# Patient Record
Sex: FEMALE | Race: ASIAN | NOT HISPANIC OR LATINO
[De-identification: names, ages, dates, MRNs, and addresses within clinical notes are randomized per-mention and may not be internally consistent; named-entity substitution may affect disease eponyms.]

---

## 2017-01-16 ENCOUNTER — RESULT REVIEW (OUTPATIENT)
Age: 67
End: 2017-01-16

## 2017-01-16 VITALS
SYSTOLIC BLOOD PRESSURE: 134 MMHG | HEIGHT: 62 IN | HEART RATE: 50 BPM | WEIGHT: 129.41 LBS | RESPIRATION RATE: 16 BRPM | TEMPERATURE: 98 F | OXYGEN SATURATION: 98 % | DIASTOLIC BLOOD PRESSURE: 59 MMHG

## 2017-01-17 ENCOUNTER — APPOINTMENT (OUTPATIENT)
Dept: GYNECOLOGIC ONCOLOGY | Facility: HOSPITAL | Age: 67
End: 2017-01-17

## 2017-01-17 ENCOUNTER — OUTPATIENT (OUTPATIENT)
Dept: OUTPATIENT SERVICES | Facility: HOSPITAL | Age: 67
LOS: 1 days | Discharge: HOME | End: 2017-01-17
Payer: COMMERCIAL

## 2017-01-17 VITALS
HEART RATE: 64 BPM | DIASTOLIC BLOOD PRESSURE: 86 MMHG | OXYGEN SATURATION: 99 % | RESPIRATION RATE: 15 BRPM | SYSTOLIC BLOOD PRESSURE: 181 MMHG

## 2017-01-17 DIAGNOSIS — Z41.9 ENCOUNTER FOR PROCEDURE FOR PURPOSES OTHER THAN REMEDYING HEALTH STATE, UNSPECIFIED: Chronic | ICD-10-CM

## 2017-01-17 PROCEDURE — 57520 CONIZATION OF CERVIX: CPT

## 2017-01-17 PROCEDURE — 88305 TISSUE EXAM BY PATHOLOGIST: CPT

## 2017-01-17 PROCEDURE — 88307 TISSUE EXAM BY PATHOLOGIST: CPT

## 2017-01-17 PROCEDURE — 88360 TUMOR IMMUNOHISTOCHEM/MANUAL: CPT

## 2017-01-17 PROCEDURE — 88341 IMHCHEM/IMCYTCHM EA ADD ANTB: CPT

## 2017-01-17 RX ORDER — SODIUM CHLORIDE 9 MG/ML
1000 INJECTION, SOLUTION INTRAVENOUS
Qty: 0 | Refills: 0 | Status: DISCONTINUED | OUTPATIENT
Start: 2017-01-17 | End: 2017-01-17

## 2017-01-17 RX ORDER — MORPHINE SULFATE 50 MG/1
4 CAPSULE, EXTENDED RELEASE ORAL
Qty: 0 | Refills: 0 | Status: DISCONTINUED | OUTPATIENT
Start: 2017-01-17 | End: 2017-01-17

## 2017-01-20 LAB — SURGICAL PATHOLOGY STUDY: SIGNIFICANT CHANGE UP

## 2017-01-23 DIAGNOSIS — N87.1 MODERATE CERVICAL DYSPLASIA: ICD-10-CM

## 2017-01-23 DIAGNOSIS — I10 ESSENTIAL (PRIMARY) HYPERTENSION: ICD-10-CM

## 2017-02-15 ENCOUNTER — APPOINTMENT (OUTPATIENT)
Dept: GYNECOLOGIC ONCOLOGY | Facility: CLINIC | Age: 67
End: 2017-02-15

## 2017-02-15 VITALS
HEART RATE: 79 BPM | DIASTOLIC BLOOD PRESSURE: 82 MMHG | SYSTOLIC BLOOD PRESSURE: 162 MMHG | WEIGHT: 133 LBS | BODY MASS INDEX: 24.48 KG/M2 | HEIGHT: 62 IN

## 2017-02-15 DIAGNOSIS — N87.9 DYSPLASIA OF CERVIX UTERI, UNSPECIFIED: ICD-10-CM

## 2017-02-15 DIAGNOSIS — D06.9 CARCINOMA IN SITU OF CERVIX, UNSPECIFIED: ICD-10-CM

## 2017-10-31 PROBLEM — I10 ESSENTIAL (PRIMARY) HYPERTENSION: Chronic | Status: ACTIVE | Noted: 2017-01-16

## 2017-10-31 PROBLEM — N87.9 DYSPLASIA OF CERVIX UTERI, UNSPECIFIED: Chronic | Status: ACTIVE | Noted: 2017-01-16

## 2017-12-11 ENCOUNTER — APPOINTMENT (OUTPATIENT)
Dept: INTERNAL MEDICINE | Facility: CLINIC | Age: 67
End: 2017-12-11
Payer: COMMERCIAL

## 2017-12-11 VITALS — DIASTOLIC BLOOD PRESSURE: 82 MMHG | SYSTOLIC BLOOD PRESSURE: 132 MMHG

## 2017-12-11 VITALS
DIASTOLIC BLOOD PRESSURE: 80 MMHG | WEIGHT: 128 LBS | HEART RATE: 70 BPM | OXYGEN SATURATION: 99 % | BODY MASS INDEX: 23.55 KG/M2 | HEIGHT: 62 IN | TEMPERATURE: 98 F | SYSTOLIC BLOOD PRESSURE: 130 MMHG

## 2017-12-11 DIAGNOSIS — Z78.0 ASYMPTOMATIC MENOPAUSAL STATE: ICD-10-CM

## 2017-12-11 PROCEDURE — 99204 OFFICE O/P NEW MOD 45 MIN: CPT

## 2017-12-11 RX ORDER — IBUPROFEN 800 MG/1
800 TABLET, FILM COATED ORAL 3 TIMES DAILY
Qty: 30 | Refills: 0 | Status: COMPLETED | COMMUNITY
Start: 2017-01-13 | End: 2017-12-11

## 2017-12-11 RX ORDER — DOCUSATE SODIUM 100 MG/1
100 CAPSULE ORAL
Qty: 20 | Refills: 0 | Status: COMPLETED | COMMUNITY
Start: 2017-01-13 | End: 2017-12-11

## 2017-12-11 RX ORDER — OXYCODONE AND ACETAMINOPHEN 5; 325 MG/1; MG/1
5-325 TABLET ORAL
Qty: 5 | Refills: 0 | Status: COMPLETED | COMMUNITY
Start: 2017-01-13 | End: 2017-12-11

## 2018-04-05 ENCOUNTER — APPOINTMENT (OUTPATIENT)
Dept: INTERNAL MEDICINE | Facility: CLINIC | Age: 68
End: 2018-04-05
Payer: MEDICARE

## 2018-04-05 VITALS
DIASTOLIC BLOOD PRESSURE: 82 MMHG | HEIGHT: 62 IN | WEIGHT: 132 LBS | BODY MASS INDEX: 24.29 KG/M2 | TEMPERATURE: 97.6 F | OXYGEN SATURATION: 98 % | HEART RATE: 78 BPM | SYSTOLIC BLOOD PRESSURE: 165 MMHG

## 2018-04-05 PROCEDURE — G0439: CPT

## 2018-05-15 LAB
APPEARANCE: CLEAR
BACTERIA: ABNORMAL
BILIRUBIN URINE: NEGATIVE
BLOOD URINE: NEGATIVE
CALCIUM OXALATE CRYSTALS: ABNORMAL
COLOR: ABNORMAL
CREAT SPEC-SCNC: 134 MG/DL
GLUCOSE QUALITATIVE U: NEGATIVE MG/DL
HYALINE CASTS: 1 /LPF
KETONES URINE: NEGATIVE
LEUKOCYTE ESTERASE URINE: NEGATIVE
MICROALBUMIN 24H UR DL<=1MG/L-MCNC: 2.5 MG/DL
MICROALBUMIN/CREAT 24H UR-RTO: 19 MG/G
MICROSCOPIC-UA: NORMAL
NITRITE URINE: NEGATIVE
PH URINE: 5
PROTEIN URINE: NEGATIVE MG/DL
RED BLOOD CELLS URINE: 2 /HPF
SPECIFIC GRAVITY URINE: 1.02
SQUAMOUS EPITHELIAL CELLS: 8 /HPF
URINE COMMENTS: NORMAL
UROBILINOGEN URINE: NEGATIVE MG/DL
WHITE BLOOD CELLS URINE: 3 /HPF

## 2018-10-16 ENCOUNTER — APPOINTMENT (OUTPATIENT)
Dept: INTERNAL MEDICINE | Facility: CLINIC | Age: 68
End: 2018-10-16
Payer: MEDICARE

## 2018-10-16 VITALS
DIASTOLIC BLOOD PRESSURE: 77 MMHG | HEIGHT: 62 IN | SYSTOLIC BLOOD PRESSURE: 139 MMHG | HEART RATE: 64 BPM | WEIGHT: 132 LBS | OXYGEN SATURATION: 97 % | TEMPERATURE: 98 F | BODY MASS INDEX: 24.29 KG/M2

## 2018-10-16 DIAGNOSIS — M25.552 PAIN IN LEFT HIP: ICD-10-CM

## 2018-10-16 PROCEDURE — 99213 OFFICE O/P EST LOW 20 MIN: CPT

## 2019-02-20 ENCOUNTER — MEDICATION RENEWAL (OUTPATIENT)
Age: 69
End: 2019-02-20

## 2019-04-11 ENCOUNTER — MEDICATION RENEWAL (OUTPATIENT)
Age: 69
End: 2019-04-11

## 2019-04-15 ENCOUNTER — MEDICATION RENEWAL (OUTPATIENT)
Age: 69
End: 2019-04-15

## 2019-05-10 ENCOUNTER — APPOINTMENT (OUTPATIENT)
Dept: INTERNAL MEDICINE | Facility: CLINIC | Age: 69
End: 2019-05-10
Payer: MEDICARE

## 2019-05-10 VITALS
SYSTOLIC BLOOD PRESSURE: 159 MMHG | HEART RATE: 78 BPM | HEIGHT: 62 IN | WEIGHT: 133 LBS | OXYGEN SATURATION: 96 % | BODY MASS INDEX: 24.48 KG/M2 | DIASTOLIC BLOOD PRESSURE: 76 MMHG | TEMPERATURE: 97.9 F

## 2019-05-10 DIAGNOSIS — Z86.19 PERSONAL HISTORY OF OTHER INFECTIOUS AND PARASITIC DISEASES: ICD-10-CM

## 2019-05-10 PROCEDURE — 90670 PCV13 VACCINE IM: CPT

## 2019-05-10 PROCEDURE — 99397 PER PM REEVAL EST PAT 65+ YR: CPT | Mod: 25

## 2019-05-10 PROCEDURE — G0009: CPT

## 2019-05-12 NOTE — HISTORY OF PRESENT ILLNESS
[FreeTextEntry1] : CPE [de-identified] : Jan 2019: [facility] labs, will send to me

## 2019-05-12 NOTE — PHYSICAL EXAM
[No Acute Distress] : no acute distress [Well Nourished] : well nourished [Well Developed] : well developed [Well-Appearing] : well-appearing [Normal Sclera/Conjunctiva] : normal sclera/conjunctiva [Normal Outer Ear/Nose] : the outer ears and nose were normal in appearance [Normal Oropharynx] : the oropharynx was normal [No JVD] : no jugular venous distention [Supple] : supple [No Lymphadenopathy] : no lymphadenopathy [Thyroid Normal, No Nodules] : the thyroid was normal and there were no nodules present [No Respiratory Distress] : no respiratory distress  [Clear to Auscultation] : lungs were clear to auscultation bilaterally [No Accessory Muscle Use] : no accessory muscle use [Normal Rate] : normal rate  [Regular Rhythm] : with a regular rhythm [Normal S1, S2] : normal S1 and S2 [No Murmur] : no murmur heard [No Edema] : there was no peripheral edema [No Extremity Clubbing/Cyanosis] : no extremity clubbing/cyanosis [Soft] : abdomen soft [Non Tender] : non-tender [Non-distended] : non-distended [No Masses] : no abdominal mass palpated [No HSM] : no HSM [Normal Bowel Sounds] : normal bowel sounds [Normal Posterior Cervical Nodes] : no posterior cervical lymphadenopathy [Normal Anterior Cervical Nodes] : no anterior cervical lymphadenopathy [No CVA Tenderness] : no CVA  tenderness [No Spinal Tenderness] : no spinal tenderness [No Joint Swelling] : no joint swelling [Grossly Normal Strength/Tone] : grossly normal strength/tone [No Rash] : no rash [Normal Gait] : normal gait [Coordination Grossly Intact] : coordination grossly intact [No Focal Deficits] : no focal deficits [Normal Affect] : the affect was normal [Alert and Oriented x3] : oriented to person, place, and time [Normal Insight/Judgement] : insight and judgment were intact [de-identified] : anxious when discussing recent episode with her

## 2019-05-12 NOTE — HEALTH RISK ASSESSMENT
[0] : 2) Feeling down, depressed, or hopeless: Not at all (0) [FreeTextEntry1] : anxiety [[id number]] : 0

## 2019-06-04 LAB
APPEARANCE: CLEAR
BACTERIA: ABNORMAL
BILIRUBIN URINE: NEGATIVE
BLOOD URINE: NEGATIVE
COLOR: YELLOW
CREAT SPEC-SCNC: 108 MG/DL
GLUCOSE QUALITATIVE U: NEGATIVE
HYALINE CASTS: 3 /LPF
KETONES URINE: NEGATIVE
LEUKOCYTE ESTERASE URINE: NEGATIVE
MICROALBUMIN 24H UR DL<=1MG/L-MCNC: 1.7 MG/DL
MICROALBUMIN/CREAT 24H UR-RTO: 16 MG/G
MICROSCOPIC-UA: NORMAL
NITRITE URINE: NEGATIVE
PH URINE: 7
PROTEIN URINE: NORMAL
RED BLOOD CELLS URINE: 6 /HPF
SPECIFIC GRAVITY URINE: 1.02
SQUAMOUS EPITHELIAL CELLS: 4 /HPF
UROBILINOGEN URINE: NORMAL
WHITE BLOOD CELLS URINE: 2 /HPF

## 2019-06-10 ENCOUNTER — APPOINTMENT (OUTPATIENT)
Dept: INTERNAL MEDICINE | Facility: CLINIC | Age: 69
End: 2019-06-10

## 2019-07-22 ENCOUNTER — APPOINTMENT (OUTPATIENT)
Dept: INTERNAL MEDICINE | Facility: CLINIC | Age: 69
End: 2019-07-22
Payer: MEDICARE

## 2019-07-22 VITALS
BODY MASS INDEX: 23.74 KG/M2 | WEIGHT: 129 LBS | TEMPERATURE: 97.8 F | OXYGEN SATURATION: 97 % | HEIGHT: 62 IN | DIASTOLIC BLOOD PRESSURE: 80 MMHG | HEART RATE: 64 BPM | SYSTOLIC BLOOD PRESSURE: 178 MMHG

## 2019-07-22 PROCEDURE — 69210 REMOVE IMPACTED EAR WAX UNI: CPT

## 2019-07-22 PROCEDURE — 99214 OFFICE O/P EST MOD 30 MIN: CPT | Mod: 25

## 2019-07-24 ENCOUNTER — APPOINTMENT (OUTPATIENT)
Dept: OTOLARYNGOLOGY | Facility: CLINIC | Age: 69
End: 2019-07-24
Payer: MEDICARE

## 2019-07-24 VITALS
DIASTOLIC BLOOD PRESSURE: 70 MMHG | SYSTOLIC BLOOD PRESSURE: 146 MMHG | HEIGHT: 62 IN | WEIGHT: 130 LBS | HEART RATE: 58 BPM | OXYGEN SATURATION: 98 % | BODY MASS INDEX: 23.92 KG/M2 | TEMPERATURE: 98.1 F

## 2019-07-24 DIAGNOSIS — Z78.9 OTHER SPECIFIED HEALTH STATUS: ICD-10-CM

## 2019-07-24 DIAGNOSIS — Z86.79 PERSONAL HISTORY OF OTHER DISEASES OF THE CIRCULATORY SYSTEM: ICD-10-CM

## 2019-07-24 PROCEDURE — 99204 OFFICE O/P NEW MOD 45 MIN: CPT

## 2019-07-24 NOTE — REVIEW OF SYSTEMS
[Dizziness] : dizziness [Unsteady Walking] : ataxia [Negative] : Psychiatric [Confusion] : no confusion [Headache] : no headache [Fainting] : no fainting [Memory Loss] : no memory loss

## 2019-07-24 NOTE — PHYSICAL EXAM
[Midline] : trachea located in midline position [] : [place]-Hallpike test is positive [Normal] : no rashes [de-identified] : r [de-identified] : gait steady

## 2019-07-24 NOTE — HISTORY OF PRESENT ILLNESS
[FreeTextEntry8] : vertigo.\par \par 1 week ago: was lying on side, felt like the ceiling was spinning for a few seconds. Much worse when moving head position, felt off balance.  Went to urgent care last Friday: BP elevated systolic 180.  Diagnosed with UTI, prescribed Bactrim, still on last 2 days of it.  No tinnitus or hearing changes.  No f/chills.  Friend suggested meclizine but it hasn't lasted long enough to be worth trying a medication.\par \par She is currently wearing Holter monitor, atenolol was decreased to 12.5 mg daily.

## 2019-07-24 NOTE — HISTORY OF PRESENT ILLNESS
[de-identified] : 69 yop woman with positional verigo for about a month - began with treatment for a uti. She feels the sx are severe but can last only secs, brought about by posnal change. she went to urgent care and was referred to ent., denies hl or tinnitus. states Dr Tapia removed cerumen recently. Has not had this in the past. no fh relevant to cc. denies head trauma or other inciting event no hl or tinnitus.

## 2019-07-24 NOTE — HISTORY OF PRESENT ILLNESS
[de-identified] : 69 yop woman with positional verigo for about a month - began with treatment for a uti. She feels the sx are severe but can last only secs, brought about by posnal change. she went to urgent care and was referred to ent., denies hl or tinnitus. states Dr Tapia removed cerumen recently. Has not had this in the past. no fh relevant to cc. denies head trauma or other inciting event no hl or tinnitus.

## 2019-07-24 NOTE — PHYSICAL EXAM
[Coordination Grossly Intact] : coordination grossly intact [No Focal Deficits] : no focal deficits [Alert and Oriented x3] : oriented to person, place, and time [Normal Gait] : normal gait [Normal] : affect was normal and insight and judgment were intact [de-identified] : R cerumen impaction cleared with curette.  L TM clear

## 2019-07-24 NOTE — ASSESSMENT
[FreeTextEntry1] : She is describing positional vertigo.  ENT eval was recommended by urgent care.  I agree with this.  Perhaps related to her UTI or R cerumen impaction.  Will prescribe meclizine in case it ever lasts for longer.  Recommended slow position changes, particularly first thing in AM.\par \par She inquired about stopping atenolol completely.  Recommended she stay on 12.5 mg daily as she might be at risk for tachycardia if she stops it.  She'll follow-up with her cardiologist after Holter and specifically discuss.

## 2019-07-24 NOTE — PHYSICAL EXAM
[Midline] : trachea located in midline position [] : [place]-Hallpike test is positive [Normal] : no rashes [de-identified] : gait steady [de-identified] : r

## 2019-07-25 ENCOUNTER — FORM ENCOUNTER (OUTPATIENT)
Age: 69
End: 2019-07-25

## 2019-07-26 ENCOUNTER — TRANSCRIPTION ENCOUNTER (OUTPATIENT)
Age: 69
End: 2019-07-26

## 2019-07-26 ENCOUNTER — OUTPATIENT (OUTPATIENT)
Dept: OUTPATIENT SERVICES | Facility: HOSPITAL | Age: 69
LOS: 1 days | End: 2019-07-26
Payer: COMMERCIAL

## 2019-07-26 ENCOUNTER — APPOINTMENT (OUTPATIENT)
Dept: ULTRASOUND IMAGING | Facility: HOSPITAL | Age: 69
End: 2019-07-26

## 2019-07-26 DIAGNOSIS — Z41.9 ENCOUNTER FOR PROCEDURE FOR PURPOSES OTHER THAN REMEDYING HEALTH STATE, UNSPECIFIED: Chronic | ICD-10-CM

## 2019-07-26 PROCEDURE — 93880 EXTRACRANIAL BILAT STUDY: CPT | Mod: 26

## 2019-07-26 PROCEDURE — 93880 EXTRACRANIAL BILAT STUDY: CPT

## 2019-08-14 ENCOUNTER — APPOINTMENT (OUTPATIENT)
Dept: OTOLARYNGOLOGY | Facility: CLINIC | Age: 69
End: 2019-08-14
Payer: MEDICARE

## 2019-08-14 VITALS
DIASTOLIC BLOOD PRESSURE: 86 MMHG | WEIGHT: 132 LBS | HEIGHT: 62 IN | BODY MASS INDEX: 24.29 KG/M2 | HEART RATE: 71 BPM | SYSTOLIC BLOOD PRESSURE: 163 MMHG

## 2019-08-14 DIAGNOSIS — Z87.898 PERSONAL HISTORY OF OTHER SPECIFIED CONDITIONS: ICD-10-CM

## 2019-08-14 DIAGNOSIS — H61.21 IMPACTED CERUMEN, RIGHT EAR: ICD-10-CM

## 2019-08-14 DIAGNOSIS — J35.8 OTHER CHRONIC DISEASES OF TONSILS AND ADENOIDS: ICD-10-CM

## 2019-08-14 PROCEDURE — 31575 DIAGNOSTIC LARYNGOSCOPY: CPT

## 2019-08-14 PROCEDURE — 99213 OFFICE O/P EST LOW 20 MIN: CPT | Mod: 25

## 2019-08-19 ENCOUNTER — FORM ENCOUNTER (OUTPATIENT)
Age: 69
End: 2019-08-19

## 2019-08-20 ENCOUNTER — OUTPATIENT (OUTPATIENT)
Dept: OUTPATIENT SERVICES | Facility: HOSPITAL | Age: 69
LOS: 1 days | End: 2019-08-20
Payer: MEDICARE

## 2019-08-20 ENCOUNTER — APPOINTMENT (OUTPATIENT)
Dept: ULTRASOUND IMAGING | Facility: HOSPITAL | Age: 69
End: 2019-08-20
Payer: MEDICARE

## 2019-08-20 DIAGNOSIS — Z41.9 ENCOUNTER FOR PROCEDURE FOR PURPOSES OTHER THAN REMEDYING HEALTH STATE, UNSPECIFIED: Chronic | ICD-10-CM

## 2019-08-20 PROCEDURE — 76536 US EXAM OF HEAD AND NECK: CPT

## 2019-08-20 PROCEDURE — 76536 US EXAM OF HEAD AND NECK: CPT | Mod: 26

## 2019-10-01 PROBLEM — H61.21 IMPACTED CERUMEN OF RIGHT EAR: Status: RESOLVED | Noted: 2019-07-24 | Resolved: 2019-10-01

## 2019-10-01 PROBLEM — J35.8 CRYPTIC TONSIL: Status: ACTIVE | Noted: 2019-10-01

## 2019-10-01 PROBLEM — Z87.898 HISTORY OF VERTIGO: Status: RESOLVED | Noted: 2019-07-22 | Resolved: 2019-10-01

## 2019-10-01 RX ORDER — ZOSTER VACCINE RECOMBINANT, ADJUVANTED 50 MCG/0.5
50 KIT INTRAMUSCULAR
Qty: 1 | Refills: 1 | Status: COMPLETED | COMMUNITY
Start: 2019-05-10 | End: 2019-05-10

## 2019-10-01 RX ORDER — MECLIZINE HYDROCHLORIDE 12.5 MG/1
12.5 TABLET ORAL
Qty: 15 | Refills: 0 | Status: DISCONTINUED | COMMUNITY
Start: 2019-07-22 | End: 2019-08-01

## 2019-10-01 RX ORDER — SULFAMETHOXAZOLE AND TRIMETHOPRIM 800; 160 MG/1; MG/1
TABLET ORAL
Refills: 0 | Status: COMPLETED | COMMUNITY
End: 2019-08-01

## 2019-10-01 NOTE — PROCEDURE
[de-identified] : \par Indication: tracheal deviation\par -Verbal consent was obtained from patient prior to procedure.\par Flexible laryngoscopy was performed via mouth and revealed the following:\par   -- Base of tongue was symmetric and not enlarged.\par   -- Vallecula was clear\par   -- Epiglottis, both aryepiglottic folds and both false vocal folds were normal\par   -- Arytenoids both without edema and erythema \par   -- True vocal folds were fully mobile and without lesions. \par   -- Post cricoid area was clear.\par   -- Interarytenoid edema was absent\par \par The patient tolerated the procedure well.\par \par

## 2019-10-01 NOTE — CONSULT LETTER
[Dear  ___] : Dear  [unfilled], [Consult Letter:] : I had the pleasure of evaluating your patient, [unfilled]. [Consult Closing:] : Thank you very much for allowing me to participate in the care of this patient.  If you have any questions, please do not hesitate to contact me. [Sincerely,] : Sincerely, [FreeTextEntry2] : Zully Tapia MD\par [address]\[place] [address]\par  [FreeTextEntry1] : \par \par Enclosed please find my office notes for August 14, 2019. \par \par  [FreeTextEntry3] : \par Lara Block MD \par Otolaryngology, Head and Neck Surgery \par \par

## 2019-10-01 NOTE — ASSESSMENT
[FreeTextEntry1] : Ms. Marquez was evaluated for the following today:\par \par 1.) multinodular goiter, with nodule seen on recent carotid US.\par Hx of thyroid nodules at 5-6 years ago.  Prior medication for hyperthyroidism, now euthyroid and off meds.\par Right lobe enlarged but no discrete nodule on exam; has tracheal deviation but no compressive sx.\par 2.) vertigo from right BPPV resolved\par 3.) asymptomatic cryptic tonsils\par \par PLAN:\par - US thyroid\par \par Return after US\par

## 2019-10-01 NOTE — HISTORY OF PRESENT ILLNESS
[de-identified] : I was pleased to evaluate this 69-year-old woman who was referred by Dr. Tapia for a thyroid nodule.  \par \par Nodules noted by technician on carotid US in July 2019 but not mentioned on official report.\par Hx of nodules in thyroid about 6-7 years ago.  Had FNA of mass that showed 'water" (?cyst)\par No difficulty breathing, difficulty swallowing or voice change.\par Treated with tapazole [id number] for hyperthyroidism after developed an arrhythmia. Currently euthyroid, no meds.\par Family history is negative of thyroid disease or cancer\par No history of radiation other than routine imaging.\par \par Had vertigo last month, diagnosed with Right BPPV by Dr. Perez.\par Vertigo resolved.  No longer taking meclizine.\par \par \par \par

## 2019-10-01 NOTE — REVIEW OF SYSTEMS
[Patient Intake Form Reviewed] : Patient intake form was reviewed [Negative] : Genitourinary [As Noted in HPI] : as noted in HPI [FreeTextEntry5] : irregular heartbeat [de-identified] : thinning hair

## 2019-10-01 NOTE — PHYSICAL EXAM
[L] : deviated to the left [Laryngoscopy Performed] : laryngoscopy was performed, see procedure section for findings [Normal] : no rashes [FreeTextEntry1] : No hoarseness. [de-identified] : Thyroid lobe on right is enlarged, over 6 cm, smooth, nontender. [de-identified] : No mass other than thyroid. [de-identified] : 1-2+ cryptic with debris, bilateral. [de-identified] : Carotid pulses 2+ bilateral.

## 2019-10-02 ENCOUNTER — APPOINTMENT (OUTPATIENT)
Dept: OTOLARYNGOLOGY | Facility: CLINIC | Age: 69
End: 2019-10-02

## 2019-10-22 ENCOUNTER — FORM ENCOUNTER (OUTPATIENT)
Age: 69
End: 2019-10-22

## 2019-10-23 ENCOUNTER — APPOINTMENT (OUTPATIENT)
Dept: ULTRASOUND IMAGING | Facility: HOSPITAL | Age: 69
End: 2019-10-23
Payer: MEDICARE

## 2019-10-23 ENCOUNTER — OUTPATIENT (OUTPATIENT)
Dept: OUTPATIENT SERVICES | Facility: HOSPITAL | Age: 69
LOS: 1 days | End: 2019-10-23
Payer: MEDICARE

## 2019-10-23 ENCOUNTER — RESULT REVIEW (OUTPATIENT)
Age: 69
End: 2019-10-23

## 2019-10-23 DIAGNOSIS — Z41.9 ENCOUNTER FOR PROCEDURE FOR PURPOSES OTHER THAN REMEDYING HEALTH STATE, UNSPECIFIED: Chronic | ICD-10-CM

## 2019-10-23 PROCEDURE — 10005 FNA BX W/US GDN 1ST LES: CPT

## 2019-10-23 PROCEDURE — 88305 TISSUE EXAM BY PATHOLOGIST: CPT

## 2019-10-23 PROCEDURE — 88173 CYTOPATH EVAL FNA REPORT: CPT

## 2019-10-24 LAB — NON-GYNECOLOGICAL CYTOLOGY STUDY: SIGNIFICANT CHANGE UP

## 2020-03-23 ENCOUNTER — APPOINTMENT (OUTPATIENT)
Dept: INTERNAL MEDICINE | Facility: CLINIC | Age: 70
End: 2020-03-23
Payer: MEDICARE

## 2020-03-23 PROCEDURE — G2012 BRIEF CHECK IN BY MD/QHP: CPT

## 2020-04-09 ENCOUNTER — APPOINTMENT (OUTPATIENT)
Dept: INTERNAL MEDICINE | Facility: CLINIC | Age: 70
End: 2020-04-09
Payer: MEDICARE

## 2020-04-09 DIAGNOSIS — R23.2 FLUSHING: ICD-10-CM

## 2020-04-09 PROCEDURE — G2012 BRIEF CHECK IN BY MD/QHP: CPT

## 2020-04-09 RX ORDER — AZITHROMYCIN 500 MG/1
500 TABLET, FILM COATED ORAL
Qty: 6 | Refills: 0 | Status: COMPLETED | COMMUNITY
Start: 2020-03-23

## 2020-04-09 RX ORDER — TRAZODONE HYDROCHLORIDE 50 MG/1
50 TABLET ORAL
Qty: 15 | Refills: 0 | Status: COMPLETED | COMMUNITY
Start: 2019-05-10 | End: 2020-04-09

## 2020-05-12 ENCOUNTER — RX RENEWAL (OUTPATIENT)
Age: 70
End: 2020-05-12

## 2020-06-05 ENCOUNTER — RX RENEWAL (OUTPATIENT)
Age: 70
End: 2020-06-05

## 2020-07-13 ENCOUNTER — APPOINTMENT (OUTPATIENT)
Dept: INTERNAL MEDICINE | Facility: CLINIC | Age: 70
End: 2020-07-13
Payer: MEDICARE

## 2020-07-13 VITALS
DIASTOLIC BLOOD PRESSURE: 85 MMHG | SYSTOLIC BLOOD PRESSURE: 180 MMHG | HEIGHT: 62 IN | OXYGEN SATURATION: 98 % | WEIGHT: 124 LBS | BODY MASS INDEX: 22.82 KG/M2 | TEMPERATURE: 99 F | HEART RATE: 84 BPM

## 2020-07-13 VITALS — DIASTOLIC BLOOD PRESSURE: 84 MMHG | SYSTOLIC BLOOD PRESSURE: 146 MMHG

## 2020-07-13 DIAGNOSIS — M25.511 PAIN IN RIGHT SHOULDER: ICD-10-CM

## 2020-07-13 DIAGNOSIS — R68.83 CHILLS (WITHOUT FEVER): ICD-10-CM

## 2020-07-13 DIAGNOSIS — F41.9 ANXIETY DISORDER, UNSPECIFIED: ICD-10-CM

## 2020-07-13 DIAGNOSIS — F51.05 ANXIETY DISORDER, UNSPECIFIED: ICD-10-CM

## 2020-07-13 DIAGNOSIS — Z12.11 ENCOUNTER FOR SCREENING FOR MALIGNANT NEOPLASM OF COLON: ICD-10-CM

## 2020-07-13 PROCEDURE — 99397 PER PM REEVAL EST PAT 65+ YR: CPT | Mod: 25

## 2020-07-13 PROCEDURE — 36415 COLL VENOUS BLD VENIPUNCTURE: CPT

## 2020-07-13 RX ORDER — ROSUVASTATIN CALCIUM 10 MG/1
10 TABLET, FILM COATED ORAL
Refills: 0 | Status: COMPLETED | COMMUNITY
End: 2020-07-13

## 2020-07-15 ENCOUNTER — RX RENEWAL (OUTPATIENT)
Age: 70
End: 2020-07-15

## 2020-09-08 ENCOUNTER — RX RENEWAL (OUTPATIENT)
Age: 70
End: 2020-09-08

## 2020-09-08 ENCOUNTER — TRANSCRIPTION ENCOUNTER (OUTPATIENT)
Age: 70
End: 2020-09-08

## 2020-09-08 LAB
25(OH)D3 SERPL-MCNC: 29.5 NG/ML
ALBUMIN SERPL ELPH-MCNC: 5 G/DL
ALP BLD-CCNC: 61 U/L
ALT SERPL-CCNC: 14 U/L
ANION GAP SERPL CALC-SCNC: 17 MMOL/L
APPEARANCE: CLEAR
AST SERPL-CCNC: 21 U/L
BACTERIA: ABNORMAL
BASOPHILS # BLD AUTO: 0.11 K/UL
BASOPHILS NFR BLD AUTO: 1.6 %
BILIRUB SERPL-MCNC: 0.5 MG/DL
BILIRUBIN URINE: NEGATIVE
BLOOD URINE: NEGATIVE
BUN SERPL-MCNC: 11 MG/DL
CALCIUM SERPL-MCNC: 10.1 MG/DL
CHLORIDE SERPL-SCNC: 105 MMOL/L
CHOLEST SERPL-MCNC: 164 MG/DL
CHOLEST/HDLC SERPL: 2.4 RATIO
CO2 SERPL-SCNC: 23 MMOL/L
COLOR: NORMAL
CREAT SERPL-MCNC: 0.55 MG/DL
CREAT SPEC-SCNC: 34 MG/DL
EOSINOPHIL # BLD AUTO: 0.17 K/UL
EOSINOPHIL NFR BLD AUTO: 2.5 %
ESTIMATED AVERAGE GLUCOSE: 108 MG/DL
FOLATE SERPL-MCNC: >20 NG/ML
GLUCOSE QUALITATIVE U: NEGATIVE
GLUCOSE SERPL-MCNC: 101 MG/DL
HBA1C MFR BLD HPLC: 5.4 %
HCT VFR BLD CALC: 46.4 %
HDLC SERPL-MCNC: 69 MG/DL
HGB BLD-MCNC: 14.3 G/DL
HYALINE CASTS: 4 /LPF
IMM GRANULOCYTES NFR BLD AUTO: 0.1 %
KETONES URINE: NEGATIVE
LDLC SERPL CALC-MCNC: 73 MG/DL
LEUKOCYTE ESTERASE URINE: ABNORMAL
LYMPHOCYTES # BLD AUTO: 1.96 K/UL
LYMPHOCYTES NFR BLD AUTO: 29.3 %
MAN DIFF?: NORMAL
MCHC RBC-ENTMCNC: 28.2 PG
MCHC RBC-ENTMCNC: 30.8 GM/DL
MCV RBC AUTO: 91.5 FL
MICROALBUMIN 24H UR DL<=1MG/L-MCNC: 1.2 MG/DL
MICROALBUMIN/CREAT 24H UR-RTO: 36 MG/G
MICROSCOPIC-UA: NORMAL
MONOCYTES # BLD AUTO: 0.44 K/UL
MONOCYTES NFR BLD AUTO: 6.6 %
NEUTROPHILS # BLD AUTO: 4 K/UL
NEUTROPHILS NFR BLD AUTO: 59.9 %
NITRITE URINE: POSITIVE
PH URINE: 6.5
PLATELET # BLD AUTO: 253 K/UL
POTASSIUM SERPL-SCNC: 4.2 MMOL/L
PROT SERPL-MCNC: 8 G/DL
PROTEIN URINE: NEGATIVE
RBC # BLD: 5.07 M/UL
RBC # FLD: 13.1 %
RED BLOOD CELLS URINE: 1 /HPF
SODIUM SERPL-SCNC: 145 MMOL/L
SPECIFIC GRAVITY URINE: 1.01
SQUAMOUS EPITHELIAL CELLS: 2 /HPF
T4 FREE SERPL-MCNC: 1.4 NG/DL
TRIGL SERPL-MCNC: 108 MG/DL
TSH SERPL-ACNC: 0.5 UIU/ML
UROBILINOGEN URINE: NORMAL
VIT B12 SERPL-MCNC: 555 PG/ML
WBC # FLD AUTO: 6.69 K/UL
WHITE BLOOD CELLS URINE: 2 /HPF

## 2020-10-19 ENCOUNTER — APPOINTMENT (OUTPATIENT)
Dept: INTERNAL MEDICINE | Facility: CLINIC | Age: 70
End: 2020-10-19
Payer: MEDICARE

## 2020-10-19 VITALS — TEMPERATURE: 97.6 F

## 2020-10-19 PROCEDURE — G0009: CPT

## 2020-10-19 PROCEDURE — 90732 PPSV23 VACC 2 YRS+ SUBQ/IM: CPT

## 2020-10-28 ENCOUNTER — APPOINTMENT (OUTPATIENT)
Dept: INTERNAL MEDICINE | Facility: CLINIC | Age: 70
End: 2020-10-28
Payer: MEDICARE

## 2020-10-28 ENCOUNTER — NON-APPOINTMENT (OUTPATIENT)
Age: 70
End: 2020-10-28

## 2020-10-28 VITALS
OXYGEN SATURATION: 95 % | SYSTOLIC BLOOD PRESSURE: 163 MMHG | TEMPERATURE: 98 F | WEIGHT: 120 LBS | DIASTOLIC BLOOD PRESSURE: 82 MMHG | BODY MASS INDEX: 22.08 KG/M2 | HEIGHT: 62 IN | HEART RATE: 79 BPM

## 2020-10-28 VITALS — SYSTOLIC BLOOD PRESSURE: 134 MMHG | DIASTOLIC BLOOD PRESSURE: 82 MMHG

## 2020-10-28 DIAGNOSIS — Z86.19 PERSONAL HISTORY OF OTHER INFECTIOUS AND PARASITIC DISEASES: ICD-10-CM

## 2020-10-28 PROCEDURE — 99214 OFFICE O/P EST MOD 30 MIN: CPT | Mod: 25

## 2020-10-28 PROCEDURE — 36415 COLL VENOUS BLD VENIPUNCTURE: CPT

## 2020-10-28 PROCEDURE — 99072 ADDL SUPL MATRL&STAF TM PHE: CPT

## 2020-10-28 PROCEDURE — 93000 ELECTROCARDIOGRAM COMPLETE: CPT

## 2020-11-04 LAB
ALBUMIN SERPL ELPH-MCNC: 4.7 G/DL
ALP BLD-CCNC: 66 U/L
ALT SERPL-CCNC: 10 U/L
ANION GAP SERPL CALC-SCNC: 12 MMOL/L
APPEARANCE: CLEAR
APTT BLD: 34.7 SEC
AST SERPL-CCNC: 17 U/L
BACTERIA: ABNORMAL
BASOPHILS # BLD AUTO: 0.14 K/UL
BASOPHILS NFR BLD AUTO: 2.2 %
BILIRUB SERPL-MCNC: 0.5 MG/DL
BILIRUBIN URINE: NEGATIVE
BLOOD URINE: NEGATIVE
BUN SERPL-MCNC: 13 MG/DL
CALCIUM SERPL-MCNC: 9.7 MG/DL
CHLORIDE SERPL-SCNC: 103 MMOL/L
CO2 SERPL-SCNC: 27 MMOL/L
COLOR: YELLOW
CREAT SERPL-MCNC: 0.55 MG/DL
EOSINOPHIL # BLD AUTO: 0.14 K/UL
EOSINOPHIL NFR BLD AUTO: 2.2 %
GLUCOSE QUALITATIVE U: NEGATIVE
GLUCOSE SERPL-MCNC: 101 MG/DL
HCT VFR BLD CALC: 44.9 %
HGB BLD-MCNC: 13.4 G/DL
HYALINE CASTS: 0 /LPF
IMM GRANULOCYTES NFR BLD AUTO: 0.2 %
INR PPP: 1.01 RATIO
KETONES URINE: NEGATIVE
LEUKOCYTE ESTERASE URINE: NEGATIVE
LYMPHOCYTES # BLD AUTO: 2.26 K/UL
LYMPHOCYTES NFR BLD AUTO: 35.1 %
MAN DIFF?: NORMAL
MCHC RBC-ENTMCNC: 27.7 PG
MCHC RBC-ENTMCNC: 29.8 GM/DL
MCV RBC AUTO: 92.8 FL
MICROSCOPIC-UA: NORMAL
MONOCYTES # BLD AUTO: 0.44 K/UL
MONOCYTES NFR BLD AUTO: 6.8 %
NEUTROPHILS # BLD AUTO: 3.44 K/UL
NEUTROPHILS NFR BLD AUTO: 53.5 %
NITRITE URINE: NEGATIVE
PH URINE: 5.5
PLATELET # BLD AUTO: 254 K/UL
POTASSIUM SERPL-SCNC: 3.9 MMOL/L
PROT SERPL-MCNC: 7.2 G/DL
PROTEIN URINE: NEGATIVE
PT BLD: 11.9 SEC
RBC # BLD: 4.84 M/UL
RBC # FLD: 12.7 %
RED BLOOD CELLS URINE: 2 /HPF
SODIUM SERPL-SCNC: 143 MMOL/L
SPECIFIC GRAVITY URINE: 1.02
SQUAMOUS EPITHELIAL CELLS: 1 /HPF
UROBILINOGEN URINE: NORMAL
WBC # FLD AUTO: 6.43 K/UL
WHITE BLOOD CELLS URINE: 2 /HPF

## 2020-11-08 ENCOUNTER — TRANSCRIPTION ENCOUNTER (OUTPATIENT)
Age: 70
End: 2020-11-08

## 2020-11-09 ENCOUNTER — OUTPATIENT (OUTPATIENT)
Dept: OUTPATIENT SERVICES | Facility: HOSPITAL | Age: 70
LOS: 1 days | Discharge: HOME | End: 2020-11-09
Payer: MEDICARE

## 2020-11-09 ENCOUNTER — RESULT REVIEW (OUTPATIENT)
Age: 70
End: 2020-11-09

## 2020-11-09 DIAGNOSIS — Z41.9 ENCOUNTER FOR PROCEDURE FOR PURPOSES OTHER THAN REMEDYING HEALTH STATE, UNSPECIFIED: Chronic | ICD-10-CM

## 2020-11-09 PROCEDURE — 88305 TISSUE EXAM BY PATHOLOGIST: CPT | Mod: 26

## 2020-11-11 LAB — SURGICAL PATHOLOGY STUDY: SIGNIFICANT CHANGE UP

## 2020-11-16 ENCOUNTER — APPOINTMENT (OUTPATIENT)
Dept: INTERNAL MEDICINE | Facility: CLINIC | Age: 70
End: 2020-11-16

## 2020-11-16 DIAGNOSIS — R42 DIZZINESS AND GIDDINESS: ICD-10-CM

## 2020-11-23 PROBLEM — R42 EPISODIC LIGHTHEADEDNESS: Status: RESOLVED | Noted: 2019-07-22 | Resolved: 2020-11-23

## 2021-03-15 ENCOUNTER — RX RENEWAL (OUTPATIENT)
Age: 71
End: 2021-03-15

## 2021-03-16 ENCOUNTER — APPOINTMENT (OUTPATIENT)
Dept: INTERNAL MEDICINE | Facility: CLINIC | Age: 71
End: 2021-03-16
Payer: MEDICARE

## 2021-03-16 VITALS
HEART RATE: 70 BPM | SYSTOLIC BLOOD PRESSURE: 160 MMHG | DIASTOLIC BLOOD PRESSURE: 92 MMHG | TEMPERATURE: 97.2 F | BODY MASS INDEX: 22.26 KG/M2 | OXYGEN SATURATION: 97 % | HEIGHT: 62 IN | WEIGHT: 121 LBS

## 2021-03-16 VITALS — DIASTOLIC BLOOD PRESSURE: 78 MMHG | SYSTOLIC BLOOD PRESSURE: 175 MMHG

## 2021-03-16 DIAGNOSIS — H10.13 ACUTE ATOPIC CONJUNCTIVITIS, BILATERAL: ICD-10-CM

## 2021-03-16 DIAGNOSIS — R25.2 CRAMP AND SPASM: ICD-10-CM

## 2021-03-16 DIAGNOSIS — R68.2 DRY MOUTH, UNSPECIFIED: ICD-10-CM

## 2021-03-16 DIAGNOSIS — L65.9 NONSCARRING HAIR LOSS, UNSPECIFIED: ICD-10-CM

## 2021-03-16 PROCEDURE — 36415 COLL VENOUS BLD VENIPUNCTURE: CPT

## 2021-03-16 PROCEDURE — 99072 ADDL SUPL MATRL&STAF TM PHE: CPT

## 2021-03-16 PROCEDURE — 99214 OFFICE O/P EST MOD 30 MIN: CPT | Mod: 25

## 2021-03-26 ENCOUNTER — APPOINTMENT (OUTPATIENT)
Dept: OTOLARYNGOLOGY | Facility: CLINIC | Age: 71
End: 2021-03-26
Payer: MEDICARE

## 2021-03-26 VITALS
BODY MASS INDEX: 22.34 KG/M2 | HEIGHT: 62 IN | WEIGHT: 121.4 LBS | HEART RATE: 60 BPM | SYSTOLIC BLOOD PRESSURE: 177 MMHG | TEMPERATURE: 98 F | DIASTOLIC BLOOD PRESSURE: 80 MMHG

## 2021-03-26 DIAGNOSIS — H61.23 IMPACTED CERUMEN, BILATERAL: ICD-10-CM

## 2021-03-26 DIAGNOSIS — Z01.812 ENCOUNTER FOR PREPROCEDURAL LABORATORY EXAMINATION: ICD-10-CM

## 2021-03-26 DIAGNOSIS — Z23 ENCOUNTER FOR IMMUNIZATION: ICD-10-CM

## 2021-03-26 DIAGNOSIS — Z01.810 ENCOUNTER FOR PREPROCEDURAL CARDIOVASCULAR EXAMINATION: ICD-10-CM

## 2021-03-26 DIAGNOSIS — H81.11 BENIGN PAROXYSMAL VERTIGO, RIGHT EAR: ICD-10-CM

## 2021-03-26 DIAGNOSIS — B01.9 VARICELLA W/OUT COMPLICATION: ICD-10-CM

## 2021-03-26 PROCEDURE — 99214 OFFICE O/P EST MOD 30 MIN: CPT

## 2021-03-26 PROCEDURE — 99072 ADDL SUPL MATRL&STAF TM PHE: CPT

## 2021-03-31 ENCOUNTER — APPOINTMENT (OUTPATIENT)
Dept: ULTRASOUND IMAGING | Facility: HOSPITAL | Age: 71
End: 2021-03-31
Payer: MEDICARE

## 2021-03-31 ENCOUNTER — OUTPATIENT (OUTPATIENT)
Dept: OUTPATIENT SERVICES | Facility: HOSPITAL | Age: 71
LOS: 1 days | End: 2021-03-31
Payer: MEDICARE

## 2021-03-31 DIAGNOSIS — Z41.9 ENCOUNTER FOR PROCEDURE FOR PURPOSES OTHER THAN REMEDYING HEALTH STATE, UNSPECIFIED: Chronic | ICD-10-CM

## 2021-03-31 PROCEDURE — 76536 US EXAM OF HEAD AND NECK: CPT | Mod: 26

## 2021-03-31 PROCEDURE — 76536 US EXAM OF HEAD AND NECK: CPT

## 2021-04-12 PROBLEM — B01.9 VARICELLA WITHOUT COMPLICATION: Status: RESOLVED | Noted: 2019-05-10 | Resolved: 2021-04-12

## 2021-04-12 PROBLEM — Z01.812 PRE-OPERATIVE LABORATORY EXAMINATION: Status: RESOLVED | Noted: 2020-10-28 | Resolved: 2021-04-12

## 2021-04-12 PROBLEM — Z23 COVID-19 VACCINE ADMINISTERED: Status: RESOLVED | Noted: 2021-04-12 | Resolved: 2021-04-12

## 2021-04-12 PROBLEM — H61.23 EXCESSIVE CERUMEN IN EAR CANAL, BILATERAL: Status: ACTIVE | Noted: 2021-04-12

## 2021-04-12 PROBLEM — Z01.810 PRE-OPERATIVE CARDIOVASCULAR EXAMINATION: Status: RESOLVED | Noted: 2020-10-28 | Resolved: 2021-04-12

## 2021-04-12 PROBLEM — H81.11 BPPV (BENIGN PAROXYSMAL POSITIONAL VERTIGO), RIGHT: Status: RESOLVED | Noted: 2019-07-24 | Resolved: 2021-04-12

## 2021-04-12 RX ORDER — MIRTAZAPINE 7.5 MG/1
7.5 TABLET, FILM COATED ORAL
Qty: 90 | Refills: 0 | Status: DISCONTINUED | COMMUNITY
Start: 2020-04-09 | End: 2021-03-26

## 2021-04-12 NOTE — ASSESSMENT
[FreeTextEntry1] : Ms. Marquez had 1 episode of left epistaxis about 2 weeks ago.  No prior or subsequent nose bleeds.  On exam today, no lesion or exposed vessel noted.\par She is euthyroid and has multinodular goiter, with dominant nodule that measured 4.7 cm on US done 1 1 /2 years ago.  The FNA of that nodule was benign.  Trachea is deviated to the left side due to the goiter.\par Bilateral cerumen impactions were removed so that TMs could be visualized.\par \par PLAN: \par US thyroid \par \par Return after US\par

## 2021-04-12 NOTE — PHYSICAL EXAM
[L] : deviated to the left [Normal] : no rashes [FreeTextEntry1] : No hoarseness. [de-identified] : No mass other than thyroid. [de-identified] : Thyroid lobe on right is enlarged, over 6 cm, smooth, nontender. [de-identified] : Cerumen impactions removed with curette bilateral so that TMs could be seen. [de-identified] : No lesions or blood seen in nasal cavities. [de-identified] : Carotid pulses 2+ bilateral.

## 2021-04-12 NOTE — HISTORY OF PRESENT ILLNESS
[de-identified] : Ms Marquez is a 71 yo woman who c/o epistaxis.\par I last communicated with her in late 2019.\par \par About 2 weeks ago, she had blood from left side when blew nose. No other bleeding since then, no nasal congestion, postnasal drip.  No URI or other illness.  \par Had first COVID shot shortly before bleeding.  A few days before vaccine, had little mucus in nose.\par \par Hx of multinodular goiter with 4.7 cm right thyroid nodule on 2019 US.  FNA of that nodule was benign 10/2019\par Denies compressive sx.\par TSH 0.78 (3/16/2021)\par Needs new US thyroid.  Thyroid monitoring interrupted by pandemic.\par \par \par USG FNA of right thyroid nodule, 4.9 cm (10/23/2019)\par - Benign follicular nodule ([place] 2)\par \par US THYROID (8/20/2019) at [facility]:\par - Prior study: None. \par - RIGHT LOBE: 6.0 x 3.0 x 4.3 cm, homogeneous, with normal vascularity \par  -- 4.7 x 2.8 x 4.0 cm, mixed solid and cystic nodule; no suspicious features\par - LEFT LOBE: 4.0 x 1.2 x 1.4 cm, homogeneous, with normal vascularity and contain 3 nodules\par  -- 0.6 x 0.4 x 0.5 cm solid, hypoechoic nodule in anterior mid pole\par  -- 0.7 x 0.6 x 0.5 cm nodule solid, partially cystic with eccentric solid areas in posterior mid pole\par  -- 0.8 x 0.3 x 0.4 cm solid nodule in lower pole\par - ISTHMUS: 0.4 cm AP and contains no visible nodules. \par - CERVICAL LYMPH NODE: No abnormal lymph nodes are identified in the neck. \par - PARATHYROID EXAMINATION: Parathyroid glands not visualized.\par

## 2021-04-21 ENCOUNTER — APPOINTMENT (OUTPATIENT)
Dept: OTOLARYNGOLOGY | Facility: CLINIC | Age: 71
End: 2021-04-21
Payer: MEDICARE

## 2021-04-21 DIAGNOSIS — J39.8 OTHER SPECIFIED DISEASES OF UPPER RESPIRATORY TRACT: ICD-10-CM

## 2021-04-21 PROCEDURE — 99442: CPT

## 2021-05-04 PROBLEM — J39.8 TRACHEAL DEVIATION: Status: ACTIVE | Noted: 2019-10-01

## 2021-05-04 NOTE — CONSULT LETTER
[Dear  ___] : Dear  [unfilled], [Courtesy Letter:] : I had the pleasure of seeing your patient, [unfilled], in my office today. [Please see my note below.] : Please see my note below. [Consult Closing:] : Thank you very much for allowing me to participate in the care of this patient.  If you have any questions, please do not hesitate to contact me. [Sincerely,] : Sincerely, [FreeTextEntry2] : Zully Tapia MD\par [address]\[place] [address]\par  [FreeTextEntry3] : \par Lara Block MD \par Otolaryngology, Head and Neck Surgery \par \par

## 2021-05-04 NOTE — HISTORY OF PRESENT ILLNESS
[de-identified] : TELEPHONIC VISIT\par  Visit initiated at patient request. This audio visit is occurring during the  state of emergency due to COVID-19. Governmental regulation is restricting travel, in-person contact, recommend use of remote activities and telemedicine whenever possible. I discussed with patient the limitations of telemedicine encounters, including risks associated with the technology platform, technical difficulties, data security, and no physical exam. The patient may need further testing and workup to arrive at a diagnosis and treatment plan. We discussed this will be billed as a visit. \par Ms. MARQUEZ  understood and elected to proceed at 5:45 PM on Apr 21, 2021 \par Patient location: Home  in [place], [place].  Patient was the only participant.\par Physician location:  Office of Dr. Block at [address] [place] [address] in [place], [place]\par ----------------------------------------------------------------------------------------\par ----------------------------------------------------------------------------------------\par Ms Marquez reports no more nose bleeding\par Had blood from left side when blew nose in early April 2021.  .\par She had new US to follow multinodular goiter.\par Hx of multinodular goiter with 4.7 cm right thyroid nodule on 2019 US.  FNA of that nodule was benign 10/2019\par Denies compressive sx.\par \par LABS\par (3/16/2021) TSH 0.78 \par \par US THYROID (03/31/20210 at [facility]:\par - Prior study: Thyroid ultrasound August 20, 2019 and ultrasound fine-needle aspiration October 23, 2019.\par - RIGHT LOBE:  6.0 x 3.3 x 3.0 cm, homogenous, normal vascularity and contains a single dominant nodule\par     --- 5.3 x 2.7 x 4.0 cm (prior 5 2.8 x 4 cm) mixed solid/cystic nodule occupying majority of lobe\par - LEFT LOBE:  4.5 x 1.4 x 1.6 cm, homogenous, normal vascularity and contains 3 solid subcentimeter nodules, dominant nodules as follows.\par     --- 0.7 x 0.4 x 0.7 cm solid hypoechoic nodule in anterior lower pole (no significant change)\par     --- 0.7 x 0.5 x 0.5 cm  solid nodule in posterior mild pole (no significant change)\par - ISTHMUS:  0.3 cm and contains a new single nodule\par     --- 0.5 x 0.5 x 0.5 cm solid, partially cystic nodule with eccentric solid areas in left side\par -  No abnormal lymph nodes are identified in the neck.\par - Parathyroid glands not visualized.\par \par \par USG FNA of right thyroid nodule, 4.9 cm (10/23/2019)\par - Benign follicular nodule ([place] 2)\par \par US THYROID (8/20/2019) at [facility]:\par - Prior study: None. \par - RIGHT LOBE: 6.0 x 3.0 x 4.3 cm, homogeneous, with normal vascularity \par  -- 4.7 x 2.8 x 4.0 cm, mixed solid and cystic nodule; no suspicious features\par - LEFT LOBE: 4.0 x 1.2 x 1.4 cm, homogeneous, with normal vascularity and contain 3 nodules\par  -- 0.6 x 0.4 x 0.5 cm solid, hypoechoic nodule in anterior mid pole\par  -- 0.7 x 0.6 x 0.5 cm nodule solid, partially cystic with eccentric solid areas in posterior mid pole\par  -- 0.8 x 0.3 x 0.4 cm solid nodule in lower pole\par - ISTHMUS: 0.4 cm AP and contains no visible nodules. \par - CERVICAL LYMPH NODE: No abnormal lymph nodes are identified in the neck. \par - PARATHYROID EXAMINATION: Parathyroid glands not visualized.\par

## 2021-05-05 LAB
25(OH)D3 SERPL-MCNC: 50.4 NG/ML
ANA SER IF-ACNC: NEGATIVE
APO LP(A) SERPL-MCNC: 11 NMOL/L
APPEARANCE: CLEAR
BILIRUBIN URINE: NEGATIVE
BLOOD URINE: NEGATIVE
CHOLEST SERPL-MCNC: 146 MG/DL
COLOR: YELLOW
CRP SERPL HS-MCNC: 4.46 MG/L
CRP SERPL-MCNC: 5 MG/L
ENA SS-A AB SER IA-ACNC: <0.2 AL
ENA SS-B AB SER IA-ACNC: <0.2 AL
ERYTHROCYTE [SEDIMENTATION RATE] IN BLOOD BY WESTERGREN METHOD: 40 MM/HR
ESTIMATED AVERAGE GLUCOSE: 117 MG/DL
FOLATE SERPL-MCNC: >20 NG/ML
GLUCOSE QUALITATIVE U: NEGATIVE
HBA1C MFR BLD HPLC: 5.7 %
HCYS SERPL-MCNC: 7.2 UMOL/L
HDLC SERPL-MCNC: 53 MG/DL
KETONES URINE: NEGATIVE
LDLC SERPL CALC-MCNC: 79 MG/DL
LEUKOCYTE ESTERASE URINE: NEGATIVE
NITRITE URINE: NEGATIVE
NONHDLC SERPL-MCNC: 93 MG/DL
PH URINE: 7.5
PROTEIN URINE: NEGATIVE
SPECIFIC GRAVITY URINE: 1.01
TRIGL SERPL-MCNC: 69 MG/DL
TSH SERPL-ACNC: 0.78 UIU/ML
UROBILINOGEN URINE: NORMAL
VIT B12 SERPL-MCNC: 938 PG/ML

## 2021-06-02 ENCOUNTER — APPOINTMENT (OUTPATIENT)
Dept: INTERNAL MEDICINE | Facility: CLINIC | Age: 71
End: 2021-06-02
Payer: MEDICARE

## 2021-06-02 VITALS
TEMPERATURE: 98.1 F | BODY MASS INDEX: 22.5 KG/M2 | SYSTOLIC BLOOD PRESSURE: 193 MMHG | DIASTOLIC BLOOD PRESSURE: 82 MMHG | OXYGEN SATURATION: 97 % | WEIGHT: 123 LBS | HEART RATE: 64 BPM

## 2021-06-02 VITALS — SYSTOLIC BLOOD PRESSURE: 164 MMHG | DIASTOLIC BLOOD PRESSURE: 86 MMHG

## 2021-06-02 DIAGNOSIS — M18.11 UNILATERAL PRIMARY OSTEOARTHRITIS OF FIRST CARPOMETACARPAL JOINT, RIGHT HAND: ICD-10-CM

## 2021-06-02 PROCEDURE — 99213 OFFICE O/P EST LOW 20 MIN: CPT

## 2021-06-02 PROCEDURE — 99072 ADDL SUPL MATRL&STAF TM PHE: CPT

## 2021-06-08 ENCOUNTER — APPOINTMENT (OUTPATIENT)
Dept: ORTHOPEDIC SURGERY | Facility: CLINIC | Age: 71
End: 2021-06-08
Payer: MEDICARE

## 2021-06-08 VITALS — HEIGHT: 62 IN | RESPIRATION RATE: 16 BRPM | WEIGHT: 123 LBS | BODY MASS INDEX: 22.63 KG/M2

## 2021-06-08 PROCEDURE — 73130 X-RAY EXAM OF HAND: CPT | Mod: 50

## 2021-06-08 PROCEDURE — 99072 ADDL SUPL MATRL&STAF TM PHE: CPT

## 2021-06-08 PROCEDURE — 99203 OFFICE O/P NEW LOW 30 MIN: CPT

## 2021-06-08 PROCEDURE — 20550 NJX 1 TENDON SHEATH/LIGAMENT: CPT | Mod: F5

## 2021-09-22 ENCOUNTER — APPOINTMENT (OUTPATIENT)
Dept: INTERNAL MEDICINE | Facility: CLINIC | Age: 71
End: 2021-09-22
Payer: MEDICARE

## 2021-09-22 VITALS
DIASTOLIC BLOOD PRESSURE: 88 MMHG | HEART RATE: 75 BPM | SYSTOLIC BLOOD PRESSURE: 138 MMHG | OXYGEN SATURATION: 96 % | HEIGHT: 59 IN | TEMPERATURE: 97.9 F | BODY MASS INDEX: 25 KG/M2 | WEIGHT: 124 LBS

## 2021-09-22 DIAGNOSIS — Z12.39 ENCOUNTER FOR OTHER SCREENING FOR MALIGNANT NEOPLASM OF BREAST: ICD-10-CM

## 2021-09-22 PROCEDURE — 99213 OFFICE O/P EST LOW 20 MIN: CPT | Mod: 25

## 2021-09-22 PROCEDURE — 36415 COLL VENOUS BLD VENIPUNCTURE: CPT

## 2021-10-18 ENCOUNTER — APPOINTMENT (OUTPATIENT)
Dept: ORTHOPEDIC SURGERY | Facility: CLINIC | Age: 71
End: 2021-10-18
Payer: MEDICARE

## 2021-10-18 VITALS — WEIGHT: 124 LBS | HEIGHT: 59 IN | RESPIRATION RATE: 16 BRPM | BODY MASS INDEX: 25 KG/M2

## 2021-10-18 PROCEDURE — 20550 NJX 1 TENDON SHEATH/LIGAMENT: CPT

## 2021-10-18 PROCEDURE — 99213 OFFICE O/P EST LOW 20 MIN: CPT | Mod: 25

## 2021-12-09 LAB
25(OH)D3 SERPL-MCNC: 35.5 NG/ML
ALBUMIN SERPL ELPH-MCNC: 4.5 G/DL
ALP BLD-CCNC: 86 U/L
ALT SERPL-CCNC: 30 U/L
ANION GAP SERPL CALC-SCNC: 14 MMOL/L
APO LP(A) SERPL-MCNC: <9 NMOL/L
APPEARANCE: ABNORMAL
AST SERPL-CCNC: 24 U/L
BACTERIA: NEGATIVE
BASOPHILS # BLD AUTO: 0.13 K/UL
BASOPHILS NFR BLD AUTO: 1.6 %
BILIRUB SERPL-MCNC: 0.3 MG/DL
BILIRUBIN URINE: NEGATIVE
BLOOD URINE: NEGATIVE
BUN SERPL-MCNC: 25 MG/DL
CALCIUM OXALATE CRYSTALS: ABNORMAL
CALCIUM SERPL-MCNC: 10.3 MG/DL
CHLORIDE SERPL-SCNC: 103 MMOL/L
CHOLEST SERPL-MCNC: 154 MG/DL
CO2 SERPL-SCNC: 26 MMOL/L
COLOR: NORMAL
CREAT SERPL-MCNC: 0.62 MG/DL
CREAT SPEC-SCNC: 98 MG/DL
CRP SERPL HS-MCNC: 4.07 MG/L
EOSINOPHIL # BLD AUTO: 0.59 K/UL
EOSINOPHIL NFR BLD AUTO: 7.5 %
ESTIMATED AVERAGE GLUCOSE: 114 MG/DL
FOLATE SERPL-MCNC: >20 NG/ML
GLUCOSE QUALITATIVE U: NEGATIVE
GLUCOSE SERPL-MCNC: 105 MG/DL
HBA1C MFR BLD HPLC: 5.6 %
HCT VFR BLD CALC: 45.5 %
HCYS SERPL-MCNC: 7.7 UMOL/L
HDLC SERPL-MCNC: 63 MG/DL
HGB BLD-MCNC: 13.6 G/DL
HYALINE CASTS: 0 /LPF
IMM GRANULOCYTES NFR BLD AUTO: 0.1 %
KETONES URINE: NEGATIVE
LDLC SERPL CALC-MCNC: 68 MG/DL
LEUKOCYTE ESTERASE URINE: ABNORMAL
LYMPHOCYTES # BLD AUTO: 2.46 K/UL
LYMPHOCYTES NFR BLD AUTO: 31.1 %
MAGNESIUM SERPL-MCNC: 2.1 MG/DL
MAN DIFF?: NORMAL
MCHC RBC-ENTMCNC: 27.8 PG
MCHC RBC-ENTMCNC: 29.9 GM/DL
MCV RBC AUTO: 92.9 FL
MICROALBUMIN 24H UR DL<=1MG/L-MCNC: 1.4 MG/DL
MICROALBUMIN/CREAT 24H UR-RTO: 14 MG/G
MICROSCOPIC-UA: NORMAL
MONOCYTES # BLD AUTO: 0.67 K/UL
MONOCYTES NFR BLD AUTO: 8.5 %
NEUTROPHILS # BLD AUTO: 4.04 K/UL
NEUTROPHILS NFR BLD AUTO: 51.2 %
NITRITE URINE: NEGATIVE
NONHDLC SERPL-MCNC: 91 MG/DL
PH URINE: 5
PLATELET # BLD AUTO: 241 K/UL
POTASSIUM SERPL-SCNC: 4.7 MMOL/L
PROT SERPL-MCNC: 7.6 G/DL
PROTEIN URINE: NEGATIVE
RBC # BLD: 4.9 M/UL
RBC # FLD: 12.6 %
RED BLOOD CELLS URINE: 1 /HPF
SODIUM SERPL-SCNC: 143 MMOL/L
SPECIFIC GRAVITY URINE: 1.02
SQUAMOUS EPITHELIAL CELLS: 1 /HPF
T3 SERPL-MCNC: 128 NG/DL
T4 FREE SERPL-MCNC: 1.5 NG/DL
TRIGL SERPL-MCNC: 115 MG/DL
TSH SERPL-ACNC: 1.31 UIU/ML
URIC ACID CRYSTALS: ABNORMAL
UROBILINOGEN URINE: NORMAL
VIT B12 SERPL-MCNC: 655 PG/ML
WBC # FLD AUTO: 7.9 K/UL
WHITE BLOOD CELLS URINE: 7 /HPF

## 2022-03-22 ENCOUNTER — APPOINTMENT (OUTPATIENT)
Dept: INTERNAL MEDICINE | Facility: CLINIC | Age: 72
End: 2022-03-22
Payer: MEDICARE

## 2022-03-22 VITALS
HEIGHT: 59 IN | HEART RATE: 63 BPM | OXYGEN SATURATION: 97 % | WEIGHT: 129 LBS | TEMPERATURE: 97.7 F | BODY MASS INDEX: 26 KG/M2 | DIASTOLIC BLOOD PRESSURE: 87 MMHG | SYSTOLIC BLOOD PRESSURE: 145 MMHG

## 2022-03-22 VITALS — DIASTOLIC BLOOD PRESSURE: 76 MMHG | SYSTOLIC BLOOD PRESSURE: 122 MMHG

## 2022-03-22 DIAGNOSIS — I49.9 CARDIAC ARRHYTHMIA, UNSPECIFIED: ICD-10-CM

## 2022-03-22 DIAGNOSIS — Z23 ENCOUNTER FOR IMMUNIZATION: ICD-10-CM

## 2022-03-22 DIAGNOSIS — E04.2 NONTOXIC MULTINODULAR GOITER: ICD-10-CM

## 2022-03-22 DIAGNOSIS — S69.91XA UNSPECIFIED INJURY OF RIGHT WRIST, HAND AND FINGER(S), INITIAL ENCOUNTER: ICD-10-CM

## 2022-03-22 PROCEDURE — G0439: CPT

## 2022-03-22 PROCEDURE — 90471 IMMUNIZATION ADMIN: CPT

## 2022-03-22 PROCEDURE — 69210 REMOVE IMPACTED EAR WAX UNI: CPT

## 2022-03-22 PROCEDURE — 90750 HZV VACC RECOMBINANT IM: CPT

## 2022-03-31 ENCOUNTER — TRANSCRIPTION ENCOUNTER (OUTPATIENT)
Age: 72
End: 2022-03-31

## 2022-03-31 ENCOUNTER — NON-APPOINTMENT (OUTPATIENT)
Age: 72
End: 2022-03-31

## 2022-04-07 ENCOUNTER — TRANSCRIPTION ENCOUNTER (OUTPATIENT)
Age: 72
End: 2022-04-07

## 2022-04-07 ENCOUNTER — NON-APPOINTMENT (OUTPATIENT)
Age: 72
End: 2022-04-07

## 2022-04-07 LAB
25(OH)D3 SERPL-MCNC: 32.2 NG/ML
ALBUMIN SERPL ELPH-MCNC: 4.7 G/DL
ALP BLD-CCNC: 71 U/L
ALT SERPL-CCNC: 9 U/L
ANION GAP SERPL CALC-SCNC: 11 MMOL/L
APPEARANCE: ABNORMAL
AST SERPL-CCNC: 17 U/L
BACTERIA: ABNORMAL
BASOPHILS # BLD AUTO: 0.11 K/UL
BASOPHILS NFR BLD AUTO: 1.9 %
BILIRUB SERPL-MCNC: 0.4 MG/DL
BILIRUBIN URINE: NEGATIVE
BLOOD URINE: NEGATIVE
BUN SERPL-MCNC: 14 MG/DL
CALCIUM OXALATE CRYSTALS: ABNORMAL
CALCIUM SERPL-MCNC: 9.8 MG/DL
CHLORIDE SERPL-SCNC: 106 MMOL/L
CHOLEST SERPL-MCNC: 140 MG/DL
CO2 SERPL-SCNC: 26 MMOL/L
COLOR: YELLOW
CREAT SERPL-MCNC: 0.52 MG/DL
CREAT SPEC-SCNC: 115 MG/DL
EGFR: 100 ML/MIN/1.73M2
EOSINOPHIL # BLD AUTO: 0.6 K/UL
EOSINOPHIL NFR BLD AUTO: 10.1 %
ESTIMATED AVERAGE GLUCOSE: 117 MG/DL
GLUCOSE QUALITATIVE U: NEGATIVE
GLUCOSE SERPL-MCNC: 88 MG/DL
HBA1C MFR BLD HPLC: 5.7 %
HCT VFR BLD CALC: 46.2 %
HDLC SERPL-MCNC: 68 MG/DL
HGB BLD-MCNC: 13.6 G/DL
HYALINE CASTS: 3 /LPF
IMM GRANULOCYTES NFR BLD AUTO: 0.2 %
KETONES URINE: NEGATIVE
LDLC SERPL CALC-MCNC: 51 MG/DL
LEUKOCYTE ESTERASE URINE: ABNORMAL
LYMPHOCYTES # BLD AUTO: 1.9 K/UL
LYMPHOCYTES NFR BLD AUTO: 32.1 %
MAGNESIUM SERPL-MCNC: 1.9 MG/DL
MAN DIFF?: NORMAL
MCHC RBC-ENTMCNC: 26.7 PG
MCHC RBC-ENTMCNC: 29.4 GM/DL
MCV RBC AUTO: 90.8 FL
MICROALBUMIN 24H UR DL<=1MG/L-MCNC: 2.8 MG/DL
MICROALBUMIN/CREAT 24H UR-RTO: 24 MG/G
MICROSCOPIC-UA: NORMAL
MONOCYTES # BLD AUTO: 0.53 K/UL
MONOCYTES NFR BLD AUTO: 9 %
NEUTROPHILS # BLD AUTO: 2.77 K/UL
NEUTROPHILS NFR BLD AUTO: 46.7 %
NITRITE URINE: POSITIVE
NONHDLC SERPL-MCNC: 72 MG/DL
PH URINE: 6
PLATELET # BLD AUTO: 226 K/UL
POTASSIUM SERPL-SCNC: 4.5 MMOL/L
PROT SERPL-MCNC: 7.5 G/DL
PROTEIN URINE: NORMAL
RBC # BLD: 5.09 M/UL
RBC # FLD: 12.9 %
RED BLOOD CELLS URINE: 7 /HPF
SODIUM SERPL-SCNC: 143 MMOL/L
SPECIFIC GRAVITY URINE: 1.02
SQUAMOUS EPITHELIAL CELLS: 2 /HPF
TRIGL SERPL-MCNC: 102 MG/DL
TSH SERPL-ACNC: 1.3 UIU/ML
URINE COMMENTS: NORMAL
UROBILINOGEN URINE: NORMAL
WBC # FLD AUTO: 5.92 K/UL
WHITE BLOOD CELLS URINE: 7 /HPF

## 2022-04-13 ENCOUNTER — NON-APPOINTMENT (OUTPATIENT)
Age: 72
End: 2022-04-13

## 2022-04-13 ENCOUNTER — APPOINTMENT (OUTPATIENT)
Dept: INTERNAL MEDICINE | Facility: CLINIC | Age: 72
End: 2022-04-13
Payer: MEDICARE

## 2022-04-13 PROCEDURE — 93000 ELECTROCARDIOGRAM COMPLETE: CPT

## 2022-04-17 ENCOUNTER — NON-APPOINTMENT (OUTPATIENT)
Age: 72
End: 2022-04-17

## 2022-04-18 ENCOUNTER — TRANSCRIPTION ENCOUNTER (OUTPATIENT)
Age: 72
End: 2022-04-18

## 2022-04-18 ENCOUNTER — LABORATORY RESULT (OUTPATIENT)
Age: 72
End: 2022-04-18

## 2022-04-18 LAB
APPEARANCE: CLEAR
BACTERIA UR CULT: NORMAL
BACTERIA: NEGATIVE
BILIRUBIN URINE: NEGATIVE
BLOOD URINE: NEGATIVE
CALCIUM OXALATE CRYSTALS: ABNORMAL
COLOR: YELLOW
GLUCOSE QUALITATIVE U: NEGATIVE
HYALINE CASTS: 3 /LPF
KETONES URINE: NEGATIVE
LEUKOCYTE ESTERASE URINE: ABNORMAL
MICROSCOPIC-UA: NORMAL
NITRITE URINE: NEGATIVE
PH URINE: 5.5
PROTEIN URINE: NORMAL
RED BLOOD CELLS URINE: 2 /HPF
SPECIFIC GRAVITY URINE: 1.02
SQUAMOUS EPITHELIAL CELLS: 2 /HPF
UROBILINOGEN URINE: NORMAL
WHITE BLOOD CELLS URINE: 50 /HPF

## 2022-04-18 RX ORDER — CHLORHEXIDINE GLUCONATE 213 G/1000ML
1 SOLUTION TOPICAL DAILY
Refills: 0 | Status: DISCONTINUED | OUTPATIENT
Start: 2022-04-19 | End: 2022-04-19

## 2022-04-18 NOTE — ASU DISCHARGE PLAN (ADULT/PEDIATRIC) - CARE PROVIDER_API CALL
Bro Bello  ORTHOPAEDIC SURGERY  [address] [place] [id number]  Phone: [phone]  Fax: [phone]  Established Patient  Follow Up Time:

## 2022-04-18 NOTE — ASU PATIENT PROFILE, ADULT - NS PREOP UNDERSTANDS INFO
Solids until 12am, clears until 6am; PCR 4/18 NW/yes Time by MD; Solids until 12am, clears until 6am; PCR 4/18 NW/yes Statement Selected

## 2022-04-18 NOTE — ASU PATIENT PROFILE, ADULT - FALL HARM RISK - RISK INTERVENTIONS

## 2022-04-18 NOTE — ASU DISCHARGE PLAN (ADULT/PEDIATRIC) - ASU DC SPECIAL INSTRUCTIONSFT
Co-Directors: Bro Bello MD; Caden Piper MD; Jl العراقي MD   [facility] [address] 	  [place] [address] 	 	  Phone [phone] (HAND), Fax [phone]    [email or web address]    Hand Surgery Post Operative Instructions      DRESSING CARE:  1.	Please keep bandage ON and DRY until you return to the office for your 1st postoperative visit.   2.	In the shower you must cover bandage with a plastic bag. You can use tape or a rubber band so no water leaks into the bag.   3.	Please do not exercise as that leads to excessive sweating as the bandage will therefore become moist/ wet.    4.	Do not remove or change your bandage. You may apply more tape if dressing starts to unravel.   5.	Please do not insert any objects, such as a pencil, down into the bandage.     ELEVATION:  1.	Keep hand/wrist above heart level at all times or until bandage feels loose. This will help with swelling of the fingers and can be accomplished by using the FOAM PILLOW.   2.	 A sling will not hold your hand/wrist above your heart and therefore its use should be limited (it may also cause shoulder stiffness).     ACTIVITY:  1.	Moving your fingers daily after surgery is very important to prevent stiffness. Please open your fingers completely and close your fingers completely to achieve full range of motion.  **UNLESS TENDON REPAIR OR NERVE REPAIR SURGERY PERFORMED    2.	Move all joints of the extremity that are not immobilized to prevent stiffness (i.e. shoulder, elbow, fingers, and thumb unless instructed otherwise).   3.	Avoid activities which may re-injure your hand or finger.     DIET:   Regular diet. Start light and progress as tolerated.     PAIN MEDICINE:   1.	Pain medicine was sent to your pharmacy.  2.	Take pain medicine on an “AS NEEDED” basis according to your doctor’s instructions.   3.	Your pain will decrease over the next few days allowing you to:   •	Decrease your pain medicine quantity until you stop.   •	Increase the time between doses until you stop.   4.	You should not drink alcoholic beverages while on pain medication.   5.	Take pain medicine with food to prevent nausea.   6.	Constipation can occur. If no bowel movement occurs within 48 hours take a laxative of your choice (over the counter).	 	      CONTACT PHYSICIAN FOR:   Slight pain, swelling and bluish discoloration are to be expected. If you have breathing difficulty or chest pain dial 911 immediately. However, if the following symptoms occur notify your physician:   •	Temperature above 101° F 	        • Inability to urinate in 8 hours   •	Uncontrolled nausea/vomiting   	• Progressively increasing pain   •	Signs of wound infection 	                • Excessive bleeding  (Redness, swelling, pus-like drainage)     • Increasing numbness   •	Excessive swelling and tightness 	• Splint or cast that is too tight      OFFICE APPOINTMENT:    A staff member from the office will call you in the next 1-2 days to schedule your 1st Post Operative appointment to see your physician back in the office. *IF someone does not reach out to you in the next 1-2 days please call the office.      Patient Name _________________________________ Signature ______________________________ Date__________    Witness _____________________________________________________ Date ______________

## 2022-04-18 NOTE — ASU DISCHARGE PLAN (ADULT/PEDIATRIC) - NS MD DC FALL RISK RISK
For information on Fall & Injury Prevention, visit: [email or web address] OR  [email or web address] OR  https://www.cdc.gov/steadi/patient.html
Dr. Campuzano (Attending Physician)  Pt. with cough x 1.5 weeks today developed right sternal chest pain worse with palpation and deep breaths and ambulation today with associated shortness of breath.  Denies fever, productive sputum, leg swelling, ocp use, recent long travel, ho surgery, blood clots.  Low risk Wells, PERC negative.  ECG has twis in V3,V4 and III, aVF.  Sent troponin to eval for myocarditis but also low risk.  After discussion with Dr. Joseph these are old findings.  He also did an echo in the office today that was normal.  Will check cxr.  Most likely msk since ttp at right lower sternal border.

## 2022-04-19 ENCOUNTER — OUTPATIENT (OUTPATIENT)
Dept: OUTPATIENT SERVICES | Facility: HOSPITAL | Age: 72
LOS: 1 days | Discharge: HOME | End: 2022-04-19

## 2022-04-19 ENCOUNTER — APPOINTMENT (OUTPATIENT)
Dept: ORTHOPEDIC SURGERY | Facility: AMBULATORY SURGERY CENTER | Age: 72
End: 2022-04-19
Payer: MEDICARE

## 2022-04-19 VITALS — HEART RATE: 58 BPM | DIASTOLIC BLOOD PRESSURE: 70 MMHG | SYSTOLIC BLOOD PRESSURE: 164 MMHG

## 2022-04-19 VITALS
RESPIRATION RATE: 16 BRPM | OXYGEN SATURATION: 97 % | HEIGHT: 61 IN | HEART RATE: 58 BPM | DIASTOLIC BLOOD PRESSURE: 63 MMHG | SYSTOLIC BLOOD PRESSURE: 158 MMHG | WEIGHT: 128.97 LBS | TEMPERATURE: 97 F

## 2022-04-19 DIAGNOSIS — Z41.9 ENCOUNTER FOR PROCEDURE FOR PURPOSES OTHER THAN REMEDYING HEALTH STATE, UNSPECIFIED: Chronic | ICD-10-CM

## 2022-04-19 PROCEDURE — 26055 INCISE FINGER TENDON SHEATH: CPT | Mod: F5

## 2022-04-19 RX ORDER — OXYCODONE AND ACETAMINOPHEN 5; 325 MG/1; MG/1
2 TABLET ORAL EVERY 6 HOURS
Refills: 0 | Status: DISCONTINUED | OUTPATIENT
Start: 2022-04-19 | End: 2022-04-19

## 2022-04-19 RX ORDER — OXYCODONE AND ACETAMINOPHEN 5; 325 MG/1; MG/1
1 TABLET ORAL EVERY 4 HOURS
Refills: 0 | Status: DISCONTINUED | OUTPATIENT
Start: 2022-04-19 | End: 2022-04-19

## 2022-04-19 RX ORDER — ONDANSETRON 8 MG/1
4 TABLET, FILM COATED ORAL ONCE
Refills: 0 | Status: DISCONTINUED | OUTPATIENT
Start: 2022-04-19 | End: 2022-04-19

## 2022-04-19 RX ORDER — METOPROLOL TARTRATE 50 MG
2.5 TABLET ORAL
Refills: 0 | Status: DISCONTINUED | OUTPATIENT
Start: 2022-04-19 | End: 2022-04-19

## 2022-04-19 RX ORDER — ATENOLOL 25 MG/1
25 TABLET ORAL DAILY
Refills: 0 | Status: DISCONTINUED | OUTPATIENT
Start: 2022-04-19 | End: 2022-04-19

## 2022-04-19 RX ORDER — ACETAMINOPHEN 500 MG
650 TABLET ORAL ONCE
Refills: 0 | Status: DISCONTINUED | OUTPATIENT
Start: 2022-04-19 | End: 2022-04-19

## 2022-04-19 RX ADMIN — CHLORHEXIDINE GLUCONATE 1 APPLICATION(S): 213 SOLUTION TOPICAL at 09:05

## 2022-04-19 NOTE — PROVIDER CONTACT NOTE (MEDICATION) - SITUATION
medication not available, order was rewritten for metoprolol to be given IVP.   Pt refused and requested to have B/P rechecked.

## 2022-04-29 ENCOUNTER — APPOINTMENT (OUTPATIENT)
Dept: ORTHOPEDIC SURGERY | Facility: CLINIC | Age: 72
End: 2022-04-29
Payer: MEDICARE

## 2022-04-29 DIAGNOSIS — M65.311 TRIGGER THUMB, RIGHT THUMB: ICD-10-CM

## 2022-04-29 PROCEDURE — 99024 POSTOP FOLLOW-UP VISIT: CPT

## 2022-04-29 RX ORDER — ACETAMINOPHEN AND CODEINE 300; 30 MG/1; MG/1
300-30 TABLET ORAL
Qty: 12 | Refills: 0 | Status: DISCONTINUED | COMMUNITY
Start: 2022-04-18 | End: 2022-04-29

## 2022-05-29 ENCOUNTER — NON-APPOINTMENT (OUTPATIENT)
Age: 72
End: 2022-05-29

## 2022-05-31 PROBLEM — E78.5 HYPERLIPIDEMIA, UNSPECIFIED: Chronic | Status: ACTIVE | Noted: 2022-04-19

## 2022-06-01 ENCOUNTER — APPOINTMENT (OUTPATIENT)
Dept: INTERNAL MEDICINE | Facility: CLINIC | Age: 72
End: 2022-06-01
Payer: MEDICARE

## 2022-06-01 VITALS
DIASTOLIC BLOOD PRESSURE: 74 MMHG | HEIGHT: 59 IN | OXYGEN SATURATION: 99 % | WEIGHT: 129 LBS | SYSTOLIC BLOOD PRESSURE: 160 MMHG | BODY MASS INDEX: 26 KG/M2 | HEART RATE: 62 BPM | TEMPERATURE: 97.5 F

## 2022-06-01 DIAGNOSIS — R10.32 LEFT LOWER QUADRANT PAIN: ICD-10-CM

## 2022-06-01 DIAGNOSIS — M25.562 PAIN IN LEFT KNEE: ICD-10-CM

## 2022-06-01 DIAGNOSIS — N39.0 URINARY TRACT INFECTION, SITE NOT SPECIFIED: ICD-10-CM

## 2022-06-01 PROCEDURE — 99213 OFFICE O/P EST LOW 20 MIN: CPT

## 2022-06-02 DIAGNOSIS — R10.814 LEFT LOWER QUADRANT ABDOMINAL TENDERNESS: ICD-10-CM

## 2022-06-02 LAB
APPEARANCE: CLEAR
BACTERIA: NEGATIVE
BILIRUBIN URINE: NEGATIVE
BLOOD URINE: NEGATIVE
COLOR: YELLOW
GLUCOSE QUALITATIVE U: NEGATIVE
HYALINE CASTS: 0 /LPF
KETONES URINE: NEGATIVE
LEUKOCYTE ESTERASE URINE: ABNORMAL
MICROSCOPIC-UA: NORMAL
NITRITE URINE: NEGATIVE
PH URINE: 6.5
PROTEIN URINE: NEGATIVE
RED BLOOD CELLS URINE: 1 /HPF
SPECIFIC GRAVITY URINE: 1.01
SQUAMOUS EPITHELIAL CELLS: 1 /HPF
UROBILINOGEN URINE: NORMAL
WHITE BLOOD CELLS URINE: 5 /HPF

## 2022-06-07 LAB — BACTERIA UR CULT: NORMAL

## 2022-06-14 ENCOUNTER — TRANSCRIPTION ENCOUNTER (OUTPATIENT)
Age: 72
End: 2022-06-14

## 2022-06-14 ENCOUNTER — NON-APPOINTMENT (OUTPATIENT)
Age: 72
End: 2022-06-14

## 2022-06-15 RX ORDER — METRONIDAZOLE 500 MG/1
500 TABLET ORAL 3 TIMES DAILY
Qty: 21 | Refills: 0 | Status: COMPLETED | COMMUNITY
Start: 2022-06-02 | End: 2022-06-15

## 2022-06-16 ENCOUNTER — NON-APPOINTMENT (OUTPATIENT)
Age: 72
End: 2022-06-16

## 2022-06-17 RX ORDER — METRONIDAZOLE 375 MG/1
375 CAPSULE ORAL 4 TIMES DAILY
Qty: 20 | Refills: 0 | Status: COMPLETED | COMMUNITY
Start: 2022-06-07 | End: 2022-06-17

## 2022-06-20 ENCOUNTER — APPOINTMENT (OUTPATIENT)
Dept: INTERNAL MEDICINE | Facility: CLINIC | Age: 72
End: 2022-06-20
Payer: MEDICARE

## 2022-06-20 PROCEDURE — 99442: CPT

## 2022-06-20 RX ORDER — ATENOLOL 25 MG/1
25 TABLET ORAL DAILY
Qty: 90 | Refills: 1 | Status: COMPLETED | COMMUNITY
End: 2022-06-20

## 2022-06-20 RX ORDER — AZELASTINE HYDROCHLORIDE 0.5 MG/ML
0.05 SOLUTION/ DROPS OPHTHALMIC TWICE DAILY
Qty: 1 | Refills: 1 | Status: COMPLETED | COMMUNITY
Start: 2021-03-16 | End: 2022-06-20

## 2022-06-23 ENCOUNTER — LABORATORY RESULT (OUTPATIENT)
Age: 72
End: 2022-06-23

## 2022-06-28 RX ORDER — NITROFURANTOIN (MONOHYDRATE/MACROCRYSTALS) 25; 75 MG/1; MG/1
100 CAPSULE ORAL
Qty: 10 | Refills: 0 | Status: COMPLETED | COMMUNITY
Start: 2022-05-30 | End: 2022-06-28

## 2022-06-28 RX ORDER — ROSUVASTATIN CALCIUM 10 MG/1
10 TABLET, FILM COATED ORAL
Qty: 90 | Refills: 1 | Status: COMPLETED | COMMUNITY
Start: 2017-12-11 | End: 2022-06-28

## 2022-07-15 ENCOUNTER — APPOINTMENT (OUTPATIENT)
Dept: INTERNAL MEDICINE | Facility: CLINIC | Age: 72
End: 2022-07-15

## 2022-07-15 VITALS
DIASTOLIC BLOOD PRESSURE: 77 MMHG | SYSTOLIC BLOOD PRESSURE: 145 MMHG | HEART RATE: 77 BPM | WEIGHT: 121 LBS | BODY MASS INDEX: 24.39 KG/M2 | HEIGHT: 59 IN | TEMPERATURE: 98.2 F | OXYGEN SATURATION: 97 %

## 2022-07-15 DIAGNOSIS — R10.9 UNSPECIFIED ABDOMINAL PAIN: ICD-10-CM

## 2022-07-15 DIAGNOSIS — M71.21 SYNOVIAL CYST OF POPLITEAL SPACE [BAKER], RIGHT KNEE: ICD-10-CM

## 2022-07-15 DIAGNOSIS — R47.01 APHASIA: ICD-10-CM

## 2022-07-15 PROCEDURE — 99214 OFFICE O/P EST MOD 30 MIN: CPT

## 2022-07-15 RX ORDER — MIRTAZAPINE 7.5 MG/1
7.5 TABLET, FILM COATED ORAL AT BEDTIME
Refills: 0 | Status: COMPLETED | COMMUNITY
End: 2022-07-15

## 2022-07-15 RX ORDER — MULTIVIT-MIN/FOLIC/VIT K/LYCOP 400-300MCG
1000 TABLET ORAL
Refills: 0 | Status: COMPLETED | COMMUNITY
Start: 2020-10-28 | End: 2022-07-15

## 2022-07-15 RX ORDER — ROSUVASTATIN CALCIUM 40 MG/1
40 TABLET, FILM COATED ORAL
Qty: 90 | Refills: 0 | Status: COMPLETED | COMMUNITY
Start: 2022-06-15 | End: 2022-07-15

## 2022-07-15 RX ORDER — MULTIVIT-MIN/FA/LYCOPEN/LUTEIN .4-300-25
TABLET ORAL DAILY
Refills: 0 | Status: COMPLETED | COMMUNITY
Start: 2020-07-13 | End: 2022-07-15

## 2022-07-15 RX ORDER — ZINC 25 MG
25 TABLET ORAL
Refills: 0 | Status: COMPLETED | COMMUNITY
Start: 2020-10-28 | End: 2022-07-15

## 2022-07-15 RX ORDER — CEFPODOXIME PROXETIL 100 MG/1
100 TABLET, FILM COATED ORAL
Qty: 14 | Refills: 0 | Status: COMPLETED | COMMUNITY
Start: 2022-06-15 | End: 2022-07-15

## 2022-07-23 ENCOUNTER — OUTPATIENT (OUTPATIENT)
Dept: OUTPATIENT SERVICES | Facility: HOSPITAL | Age: 72
LOS: 1 days | End: 2022-07-23
Payer: MEDICARE

## 2022-07-23 ENCOUNTER — APPOINTMENT (OUTPATIENT)
Dept: CT IMAGING | Facility: HOSPITAL | Age: 72
End: 2022-07-23

## 2022-07-23 DIAGNOSIS — Z41.9 ENCOUNTER FOR PROCEDURE FOR PURPOSES OTHER THAN REMEDYING HEALTH STATE, UNSPECIFIED: Chronic | ICD-10-CM

## 2022-07-23 LAB — POCT ISTAT CREATININE: 0.7 MG/DL — SIGNIFICANT CHANGE UP (ref 0.5–1.3)

## 2022-07-23 PROCEDURE — 82565 ASSAY OF CREATININE: CPT

## 2022-07-23 PROCEDURE — 74177 CT ABD & PELVIS W/CONTRAST: CPT | Mod: 26

## 2022-07-23 PROCEDURE — 74177 CT ABD & PELVIS W/CONTRAST: CPT

## 2022-08-01 ENCOUNTER — APPOINTMENT (OUTPATIENT)
Dept: INTERNAL MEDICINE | Facility: CLINIC | Age: 72
End: 2022-08-01

## 2022-08-03 ENCOUNTER — APPOINTMENT (OUTPATIENT)
Dept: INTERNAL MEDICINE | Facility: CLINIC | Age: 72
End: 2022-08-03

## 2022-08-03 RX ORDER — LEVOFLOXACIN 500 MG/1
500 TABLET, FILM COATED ORAL
Qty: 5 | Refills: 0 | Status: COMPLETED | COMMUNITY
Start: 2022-07-19

## 2022-09-27 NOTE — HISTORY OF PRESENT ILLNESS
[Verbal consent obtained from patient] : the patient, [unfilled] [FreeTextEntry3] : spoke to  [de-identified] : he reports she is very frustrated because she's unable to effectively communicate \par \par she was probably a bit constipated, has been taking miralax as needed

## 2023-08-02 ENCOUNTER — NON-APPOINTMENT (OUTPATIENT)
Age: 73
End: 2023-08-02

## 2023-08-02 ENCOUNTER — APPOINTMENT (OUTPATIENT)
Dept: OPHTHALMOLOGY | Facility: CLINIC | Age: 73
End: 2023-08-02
Payer: MEDICARE

## 2023-08-02 PROCEDURE — 92250 FUNDUS PHOTOGRAPHY W/I&R: CPT

## 2023-08-02 PROCEDURE — 92004 COMPRE OPH EXAM NEW PT 1/>: CPT

## 2023-08-24 ENCOUNTER — NON-APPOINTMENT (OUTPATIENT)
Age: 73
End: 2023-08-24

## 2023-08-24 ENCOUNTER — APPOINTMENT (OUTPATIENT)
Dept: OPHTHALMOLOGY | Facility: CLINIC | Age: 73
End: 2023-08-24
Payer: MEDICARE

## 2023-08-24 PROCEDURE — 92136 OPHTHALMIC BIOMETRY: CPT

## 2023-08-24 PROCEDURE — 92012 INTRM OPH EXAM EST PATIENT: CPT

## 2023-09-13 ENCOUNTER — APPOINTMENT (OUTPATIENT)
Dept: BREAST CENTER | Facility: CLINIC | Age: 73
End: 2023-09-13
Payer: MEDICARE

## 2023-09-13 VITALS
OXYGEN SATURATION: 99 % | WEIGHT: 117 LBS | HEIGHT: 59 IN | BODY MASS INDEX: 23.59 KG/M2 | SYSTOLIC BLOOD PRESSURE: 127 MMHG | DIASTOLIC BLOOD PRESSURE: 78 MMHG | HEART RATE: 65 BPM

## 2023-09-13 PROCEDURE — 93702 BIS XTRACELL FLUID ANALYSIS: CPT | Mod: NC

## 2023-09-13 PROCEDURE — 99205 OFFICE O/P NEW HI 60 MIN: CPT

## 2023-09-15 ENCOUNTER — RESULT REVIEW (OUTPATIENT)
Age: 73
End: 2023-09-15

## 2023-09-15 ENCOUNTER — OUTPATIENT (OUTPATIENT)
Dept: OUTPATIENT SERVICES | Facility: HOSPITAL | Age: 73
LOS: 1 days | End: 2023-09-15
Payer: MEDICARE

## 2023-09-15 DIAGNOSIS — C50.911 MALIGNANT NEOPLASM OF UNSPECIFIED SITE OF RIGHT FEMALE BREAST: ICD-10-CM

## 2023-09-15 DIAGNOSIS — Z41.9 ENCOUNTER FOR PROCEDURE FOR PURPOSES OTHER THAN REMEDYING HEALTH STATE, UNSPECIFIED: Chronic | ICD-10-CM

## 2023-09-15 LAB — SURGICAL PATHOLOGY STUDY: SIGNIFICANT CHANGE UP

## 2023-09-15 PROCEDURE — 88321 CONSLTJ&REPRT SLD PREP ELSWR: CPT

## 2023-09-21 ENCOUNTER — APPOINTMENT (OUTPATIENT)
Dept: PLASTIC SURGERY | Facility: CLINIC | Age: 73
End: 2023-09-21
Payer: MEDICARE

## 2023-09-21 VITALS — HEART RATE: 63 BPM | OXYGEN SATURATION: 97 % | BODY MASS INDEX: 23.59 KG/M2 | HEIGHT: 59 IN | WEIGHT: 117 LBS

## 2023-09-21 PROCEDURE — 99204 OFFICE O/P NEW MOD 45 MIN: CPT

## 2023-09-22 ENCOUNTER — APPOINTMENT (OUTPATIENT)
Dept: INTERNAL MEDICINE | Facility: CLINIC | Age: 73
End: 2023-09-22
Payer: MEDICARE

## 2023-09-22 ENCOUNTER — NON-APPOINTMENT (OUTPATIENT)
Age: 73
End: 2023-09-22

## 2023-09-22 VITALS
OXYGEN SATURATION: 99 % | HEART RATE: 66 BPM | HEIGHT: 59 IN | DIASTOLIC BLOOD PRESSURE: 75 MMHG | WEIGHT: 117 LBS | SYSTOLIC BLOOD PRESSURE: 163 MMHG | TEMPERATURE: 97.2 F | BODY MASS INDEX: 23.59 KG/M2

## 2023-09-22 DIAGNOSIS — Z01.818 ENCOUNTER FOR OTHER PREPROCEDURAL EXAMINATION: ICD-10-CM

## 2023-09-22 PROCEDURE — 99214 OFFICE O/P EST MOD 30 MIN: CPT | Mod: 25

## 2023-09-22 PROCEDURE — 93000 ELECTROCARDIOGRAM COMPLETE: CPT

## 2023-09-22 PROCEDURE — 36415 COLL VENOUS BLD VENIPUNCTURE: CPT

## 2023-09-24 LAB
ALBUMIN SERPL ELPH-MCNC: 4.9 G/DL
ALP BLD-CCNC: 80 U/L
ALT SERPL-CCNC: 18 U/L
ANION GAP SERPL CALC-SCNC: 15 MMOL/L
APTT BLD: 35 SEC
AST SERPL-CCNC: 28 U/L
BASOPHILS # BLD AUTO: 0.12 K/UL
BASOPHILS NFR BLD AUTO: 1.7 %
BILIRUB SERPL-MCNC: 0.4 MG/DL
BUN SERPL-MCNC: 14 MG/DL
CALCIUM SERPL-MCNC: 10.2 MG/DL
CHLORIDE SERPL-SCNC: 102 MMOL/L
CO2 SERPL-SCNC: 26 MMOL/L
CREAT SERPL-MCNC: 0.6 MG/DL
EGFR: 95 ML/MIN/1.73M2
EOSINOPHIL # BLD AUTO: 0.26 K/UL
EOSINOPHIL NFR BLD AUTO: 3.6 %
GLUCOSE SERPL-MCNC: 50 MG/DL
HCT VFR BLD CALC: 45.1 %
HGB BLD-MCNC: 14.3 G/DL
IMM GRANULOCYTES NFR BLD AUTO: 0.1 %
INR PPP: 0.92 RATIO
LYMPHOCYTES # BLD AUTO: 2.51 K/UL
LYMPHOCYTES NFR BLD AUTO: 35 %
MAN DIFF?: NORMAL
MCHC RBC-ENTMCNC: 28.9 PG
MCHC RBC-ENTMCNC: 31.7 GM/DL
MCV RBC AUTO: 91.1 FL
MONOCYTES # BLD AUTO: 0.59 K/UL
MONOCYTES NFR BLD AUTO: 8.2 %
NEUTROPHILS # BLD AUTO: 3.68 K/UL
NEUTROPHILS NFR BLD AUTO: 51.4 %
PLATELET # BLD AUTO: 244 K/UL
POTASSIUM SERPL-SCNC: 4.2 MMOL/L
PROT SERPL-MCNC: 8 G/DL
PT BLD: 10.5 SEC
RBC # BLD: 4.95 M/UL
RBC # FLD: 12.5 %
SODIUM SERPL-SCNC: 143 MMOL/L
WBC # FLD AUTO: 7.17 K/UL

## 2023-09-28 ENCOUNTER — APPOINTMENT (OUTPATIENT)
Dept: PLASTIC SURGERY | Facility: CLINIC | Age: 73
End: 2023-09-28
Payer: MEDICARE

## 2023-09-28 PROCEDURE — ZZZZZ: CPT

## 2023-10-05 ENCOUNTER — TRANSCRIPTION ENCOUNTER (OUTPATIENT)
Age: 73
End: 2023-10-05

## 2023-10-05 ENCOUNTER — APPOINTMENT (OUTPATIENT)
Dept: NUCLEAR MEDICINE | Facility: HOSPITAL | Age: 73
End: 2023-10-05

## 2023-10-05 ENCOUNTER — OUTPATIENT (OUTPATIENT)
Dept: OUTPATIENT SERVICES | Facility: HOSPITAL | Age: 73
LOS: 1 days | End: 2023-10-05
Payer: MEDICARE

## 2023-10-05 VITALS
WEIGHT: 117.07 LBS | HEART RATE: 56 BPM | TEMPERATURE: 97 F | DIASTOLIC BLOOD PRESSURE: 70 MMHG | RESPIRATION RATE: 16 BRPM | OXYGEN SATURATION: 99 % | HEIGHT: 62 IN | SYSTOLIC BLOOD PRESSURE: 158 MMHG

## 2023-10-05 DIAGNOSIS — Z41.9 ENCOUNTER FOR PROCEDURE FOR PURPOSES OTHER THAN REMEDYING HEALTH STATE, UNSPECIFIED: Chronic | ICD-10-CM

## 2023-10-05 PROCEDURE — 78195 LYMPH SYSTEM IMAGING: CPT | Mod: 26

## 2023-10-05 PROCEDURE — 78195 LYMPH SYSTEM IMAGING: CPT

## 2023-10-05 PROCEDURE — A9541: CPT

## 2023-10-05 RX ORDER — ROSUVASTATIN CALCIUM 5 MG/1
1 TABLET ORAL
Qty: 0 | Refills: 0 | DISCHARGE

## 2023-10-05 RX ORDER — ATENOLOL 25 MG/1
1 TABLET ORAL
Qty: 0 | Refills: 0 | DISCHARGE

## 2023-10-05 NOTE — ASU PATIENT PROFILE, ADULT - NS PRO AD PATIENT TYPE
Patient would like a refill on her medication.   Clyde Capellan [phone]/Health Care Proxy (HCP)

## 2023-10-05 NOTE — ASU PATIENT PROFILE, ADULT - FALL HARM RISK - RISK INTERVENTIONS

## 2023-10-05 NOTE — ASU PATIENT PROFILE, ADULT - NSICDXPASTMEDICALHX_GEN_ALL_CORE_FT
PAST MEDICAL HISTORY:  Cervical dysplasia     HTN (hypertension)     Hyperlipidemia     Multiple thyroid nodules      PAST MEDICAL HISTORY:  Cerebrovascular accident (CVA) speech and cognitive impairment    Cervical dysplasia     HTN (hypertension)     Hyperlipidemia     Multiple thyroid nodules

## 2023-10-06 ENCOUNTER — OUTPATIENT (OUTPATIENT)
Dept: OUTPATIENT SERVICES | Facility: HOSPITAL | Age: 73
LOS: 1 days | Discharge: HOME | End: 2023-10-06
Payer: MEDICARE

## 2023-10-06 ENCOUNTER — TRANSCRIPTION ENCOUNTER (OUTPATIENT)
Age: 73
End: 2023-10-06

## 2023-10-06 ENCOUNTER — RESULT REVIEW (OUTPATIENT)
Age: 73
End: 2023-10-06

## 2023-10-06 ENCOUNTER — APPOINTMENT (OUTPATIENT)
Dept: BREAST CENTER | Facility: AMBULATORY SURGERY CENTER | Age: 73
End: 2023-10-06

## 2023-10-06 VITALS
HEART RATE: 80 BPM | RESPIRATION RATE: 20 BRPM | DIASTOLIC BLOOD PRESSURE: 68 MMHG | OXYGEN SATURATION: 97 % | SYSTOLIC BLOOD PRESSURE: 144 MMHG

## 2023-10-06 DIAGNOSIS — Z41.9 ENCOUNTER FOR PROCEDURE FOR PURPOSES OTHER THAN REMEDYING HEALTH STATE, UNSPECIFIED: Chronic | ICD-10-CM

## 2023-10-06 PROCEDURE — 88305 TISSUE EXAM BY PATHOLOGIST: CPT

## 2023-10-06 PROCEDURE — C1889: CPT

## 2023-10-06 PROCEDURE — 88307 TISSUE EXAM BY PATHOLOGIST: CPT | Mod: 26

## 2023-10-06 PROCEDURE — C1789: CPT

## 2023-10-06 PROCEDURE — 19357 TISS XPNDR PLMT BRST RCNSTJ: CPT | Mod: RT

## 2023-10-06 PROCEDURE — 15777 ACELLULAR DERM MATRIX IMPLT: CPT | Mod: RT

## 2023-10-06 PROCEDURE — 88331 PATH CONSLTJ SURG 1 BLK 1SPC: CPT | Mod: 26

## 2023-10-06 PROCEDURE — 86900 BLOOD TYPING SEROLOGIC ABO: CPT

## 2023-10-06 PROCEDURE — 88307 TISSUE EXAM BY PATHOLOGIST: CPT

## 2023-10-06 PROCEDURE — 76098 X-RAY EXAM SURGICAL SPECIMEN: CPT | Mod: 26

## 2023-10-06 PROCEDURE — 19307 MAST MOD RAD: CPT | Mod: RT

## 2023-10-06 PROCEDURE — 86850 RBC ANTIBODY SCREEN: CPT

## 2023-10-06 PROCEDURE — 88331 PATH CONSLTJ SURG 1 BLK 1SPC: CPT

## 2023-10-06 PROCEDURE — 76098 X-RAY EXAM SURGICAL SPECIMEN: CPT

## 2023-10-06 PROCEDURE — 86901 BLOOD TYPING SEROLOGIC RH(D): CPT

## 2023-10-06 PROCEDURE — 88305 TISSUE EXAM BY PATHOLOGIST: CPT | Mod: 26

## 2023-10-06 PROCEDURE — C9399: CPT

## 2023-10-06 PROCEDURE — 38900 IO MAP OF SENT LYMPH NODE: CPT | Mod: RT

## 2023-10-06 DEVICE — XPND TISSUE BREAST SMOOTH MOD VAR PROJ 300CC: Type: IMPLANTABLE DEVICE | Site: RIGHT | Status: FUNCTIONAL

## 2023-10-06 DEVICE — CLIP APPLIER ETHICON LIGACLIP 9 3/8" MEDIUM: Type: IMPLANTABLE DEVICE | Site: RIGHT | Status: FUNCTIONAL

## 2023-10-06 RX ORDER — ACETAMINOPHEN 500 MG
1000 TABLET ORAL EVERY 6 HOURS
Refills: 0 | Status: DISCONTINUED | OUTPATIENT
Start: 2023-10-06 | End: 2023-10-06

## 2023-10-06 RX ORDER — ATENOLOL 25 MG/1
1 TABLET ORAL
Refills: 0 | DISCHARGE

## 2023-10-06 RX ORDER — ONDANSETRON 8 MG/1
4 TABLET, FILM COATED ORAL EVERY 6 HOURS
Refills: 0 | Status: DISCONTINUED | OUTPATIENT
Start: 2023-10-06 | End: 2023-10-06

## 2023-10-06 RX ORDER — SODIUM CHLORIDE 9 MG/ML
1000 INJECTION, SOLUTION INTRAVENOUS
Refills: 0 | Status: DISCONTINUED | OUTPATIENT
Start: 2023-10-06 | End: 2023-10-06

## 2023-10-06 RX ORDER — AMLODIPINE BESYLATE 2.5 MG/1
1 TABLET ORAL
Refills: 0 | DISCHARGE

## 2023-10-06 RX ORDER — HYDROMORPHONE HYDROCHLORIDE 2 MG/ML
0.5 INJECTION INTRAMUSCULAR; INTRAVENOUS; SUBCUTANEOUS
Refills: 0 | Status: DISCONTINUED | OUTPATIENT
Start: 2023-10-06 | End: 2023-10-06

## 2023-10-06 RX ORDER — APIXABAN 2.5 MG/1
1 TABLET, FILM COATED ORAL
Refills: 0 | DISCHARGE

## 2023-10-06 RX ORDER — ROSUVASTATIN CALCIUM 5 MG/1
1 TABLET ORAL
Refills: 0 | DISCHARGE

## 2023-10-06 RX ORDER — OXYCODONE HYDROCHLORIDE 5 MG/1
1 TABLET ORAL
Qty: 10 | Refills: 0
Start: 2023-10-06

## 2023-10-06 NOTE — BRIEF OPERATIVE NOTE - NSICDXBRIEFPROCEDURE_GEN_ALL_CORE_FT
PROCEDURES:  Insertion of right breast tissue expander 06-Oct-2023 15:33:22  Fay Lowery  
PROCEDURES:  Right modified radical mastectomy 06-Oct-2023 14:24:38  Zully Serrano

## 2023-10-06 NOTE — BRIEF OPERATIVE NOTE - OPERATION/FINDINGS
Flaps raised superiorly to clavicle, medially to sternum, inferiorly to IMF, laterally to border of latissimus dorsi, and posteriorly to pec major fascia using electrocautery and sharp dissection. Deridder lymph node dissection revealed one hot and blue node which was sent for frozen and positive for malignancy so proceeded with ALNDx. Axillary lymph nodes levels 1 & 2 removed. No additional suspicious nodes identified via palpation, blue dye or radiotracer. Long thoracic and thoracodorsal nerves identified and preserved.     Closure with plastics.
Right mastectomy and sentinel lymph node biopsy done by timi, see brief op note.   Hemostasis ensured in right breast cavity. JAVIER x 2 placed and secured at skin. Irrigated with gentamycin solution. Tissue expander covered with alloderm and inserted into right breast cavity, 75cc filled. Tissue expander secured with 2-0 Vicryl circumferentially. Closed with 2-0 Vicryl, 3-0 Monocryl, 4-0 Monocryl. Dressed with dermabond, telfa, tegaderm. Drains secured with CHG dressings.

## 2023-10-06 NOTE — ASU DISCHARGE PLAN (ADULT/PEDIATRIC) - ASU DC SPECIAL INSTRUCTIONSFT
For pain, you may take 400mg of Ibuprofen every 6 hours as needed and 650mg of Tylenol every 6 hours as needed. You may alternate both medications, i.e. Ibuprofen at 12 PM, Tylenol at 3 PM, Ibuprofen 6 PM, Tylenol 9 PM, as needed.  For pain that is not resolved with over the counter medication, you may take 1 oxycodone 5mg tablet every 8 hours as needed.  Please take Duracef antibiotic as prescribed.   You may shower in 2 days. Do not remove steri strips, they will fall off on their own.     JAVIER Drain Care:  *Please look at the site every day for signs of infection (increased redness or pain, swelling, odor, yellow or bloody discharge, warm to touch, fever).  *Maintain suction of the bulb.  *Note color, consistency, and amount of fluid in the drain. Call the doctor, nurse practitioner, or VNA nurse if the amount increases significantly or changes in character.  *Be sure to empty the drain frequently. Record the output, if instructed to do so.  *You may shower; wash the area gently with warm, soapy water.  *Keep the insertion site clean and dry otherwise.  *Avoid swimming, baths, hot tubs; do not submerge yourself in water.  *Make sure to keep the drain attached securely to your body to prevent pulling or dislocation.      Incision Care:  *Please call your doctor or nurse practitioner if you have increased pain, swelling, redness, or drainage from the incision site.  *Avoid swimming and baths until your follow-up appointment.  *You may shower, and wash surgical incisions with a mild soap and warm water. Gently pat the area dry.  *Keep incision sites out of the sun as scars will darken.  *If you have staples, they will be removed at your follow-up appointment.  *If you have steri-strips, they will fall off on their own. Please remove any remaining strips 7-10 days after surgery.    General Discharge Instructions:  Please resume all regular home medications unless specifically advised not to take a particular medication. Please take any new medications as prescribed.  Please get plenty of rest, continue to ambulate several times per day, and drink adequate amounts of fluids. Avoid lifting weights greater than 5-10 lbs until you follow-up with your surgeon, who will instruct you further regarding activity restrictions.  Avoid driving or operating heavy machinery while taking pain medications.  Please follow-up with your surgeon and Primary Care Provider (PCP) as advised.    Warning Signs:  Please call your doctor or nurse practitioner if you experience the following:  *You experience new chest pain, pressure, squeezing or tightness.  *New or worsening cough, shortness of breath, or wheeze.  *If you are vomiting and cannot keep down fluids or your medications.  *You are getting dehydrated due to continued vomiting, diarrhea, or other reasons. Signs of dehydration include dry mouth, rapid heartbeat, or feeling dizzy or faint when standing.  *You see blood or dark/black material when you vomit or have a bowel movement.  *You experience burning when you urinate, have blood in your urine, or experience a discharge.  *Your pain is not improving within 8-12 hours or is not gone within 24 hours. Call or return immediately if your pain is getting worse, changes location, or moves to your chest or back.  *You have shaking chills, or fever greater than 101.5 degrees Fahrenheit or 38 degrees Celsius.  *Any change in your symptoms, or any new symptoms that concern you. You may restart your Eliquis on Wednesday 10/11.   For pain, you may take 400mg of Ibuprofen every 6 hours as needed and 650mg of Tylenol every 6 hours as needed. You may alternate both medications, i.e. Ibuprofen at 12 PM, Tylenol at 3 PM, Ibuprofen 6 PM, Tylenol 9 PM, as needed.  For pain that is not resolved with over the counter medication, you may take 1 opioid tablet as prescribed  Please take Duracef antibiotic as prescribed.   You may shower in 2 days. Do not remove steri strips, they will fall off on their own.     JAVIER Drain Care:  *Please look at the site every day for signs of infection (increased redness or pain, swelling, odor, yellow or bloody discharge, warm to touch, fever).  *Maintain suction of the bulb.  *Note color, consistency, and amount of fluid in the drain. Call the doctor, nurse practitioner, or VNA nurse if the amount increases significantly or changes in character.  *Be sure to empty the drain frequently. Record the output, if instructed to do so.  *You may shower; wash the area gently with warm, soapy water.  *Keep the insertion site clean and dry otherwise.  *Avoid swimming, baths, hot tubs; do not submerge yourself in water.  *Make sure to keep the drain attached securely to your body to prevent pulling or dislocation.      Incision Care:  *Please call your doctor or nurse practitioner if you have increased pain, swelling, redness, or drainage from the incision site.  *Avoid swimming and baths until your follow-up appointment.  *You may shower, and wash surgical incisions with a mild soap and warm water. Gently pat the area dry.  *Keep incision sites out of the sun as scars will darken.  *If you have staples, they will be removed at your follow-up appointment.  *If you have steri-strips, they will fall off on their own. Please remove any remaining strips 7-10 days after surgery.    General Discharge Instructions:  Please resume all regular home medications unless specifically advised not to take a particular medication. Please take any new medications as prescribed.  Please get plenty of rest, continue to ambulate several times per day, and drink adequate amounts of fluids. Avoid lifting weights greater than 5-10 lbs until you follow-up with your surgeon, who will instruct you further regarding activity restrictions.  Avoid driving or operating heavy machinery while taking pain medications.  Please follow-up with your surgeon and Primary Care Provider (PCP) as advised.    Warning Signs:  Please call your doctor or nurse practitioner if you experience the following:  *You experience new chest pain, pressure, squeezing or tightness.  *New or worsening cough, shortness of breath, or wheeze.  *If you are vomiting and cannot keep down fluids or your medications.  *You are getting dehydrated due to continued vomiting, diarrhea, or other reasons. Signs of dehydration include dry mouth, rapid heartbeat, or feeling dizzy or faint when standing.  *You see blood or dark/black material when you vomit or have a bowel movement.  *You experience burning when you urinate, have blood in your urine, or experience a discharge.  *Your pain is not improving within 8-12 hours or is not gone within 24 hours. Call or return immediately if your pain is getting worse, changes location, or moves to your chest or back.  *You have shaking chills, or fever greater than 101.5 degrees Fahrenheit or 38 degrees Celsius.  *Any change in your symptoms, or any new symptoms that concern you.

## 2023-10-06 NOTE — ASU DISCHARGE PLAN (ADULT/PEDIATRIC) - CARE PROVIDER_API CALL
Zari Purdy  Plastic Surgery  [address] [place] [address]  Phone: [phone]  Fax: [phone]  Follow Up Time:     Palmer Duran  Surgery  [address] [address]  Phone: [phone]  Fax: [phone]  Follow Up Time:

## 2023-10-06 NOTE — PROGRESS NOTE ADULT - SUBJECTIVE AND OBJECTIVE BOX
Procedure: R Mastectomy with tissue expander   Surgeon: Dr. Duran    S: Pt has no complaints. Denies CP, SOB, BANDA, calf tenderness. Pain controlled with medication.    O:  T(C): 36.4 (10-06-23 @ 16:48), Max: 36.4 (10-06-23 @ 16:48)  T(F): 97.5 (10-06-23 @ 16:48), Max: 97.5 (10-06-23 @ 16:48)  HR: 77 (10-06-23 @ 16:48) (70 - 77)  BP: 141/65 (10-06-23 @ 16:48) (134/67 - 148/64)  RR: 14 (10-06-23 @ 16:48) (13 - 36)  SpO2: 98% (10-06-23 @ 16:48) (98% - 100%)  Wt(kg): --            Gen: NAD, resting comfortably in bed  C/V: NSR  Incision: On R breast covered with dressings  Pulm: Nonlabored breathing, no respiratory distress  Abd: soft, NT/ND  Extrem: WWP, no calf edema, SCDs in place      A/P: 73yFemale s/p above procedure  Diet: Normal diet   IVF: LR at 100 ml/hr   Pain/nausea control Tylenol 1000 mg and Dilaudid .5mg   DVT ppx: N/a    Dispo plan: Home

## 2023-10-06 NOTE — ASU DISCHARGE PLAN (ADULT/PEDIATRIC) - NS MD DC FALL RISK RISK
For information on Fall & Injury Prevention, visit: [email or web address] OR  [email or web address] OR  https://www.cdc.gov/steadi/patient.html

## 2023-10-12 ENCOUNTER — APPOINTMENT (OUTPATIENT)
Dept: PLASTIC SURGERY | Facility: CLINIC | Age: 73
End: 2023-10-12
Payer: MEDICARE

## 2023-10-12 LAB
SURGICAL PATHOLOGY STUDY: SIGNIFICANT CHANGE UP

## 2023-10-12 PROCEDURE — 99024 POSTOP FOLLOW-UP VISIT: CPT

## 2023-10-12 RX ORDER — OXYCODONE AND ACETAMINOPHEN 5; 325 MG/1; MG/1
5-325 TABLET ORAL
Qty: 20 | Refills: 0 | Status: DISCONTINUED | COMMUNITY
Start: 2023-09-28 | End: 2023-10-12

## 2023-10-19 ENCOUNTER — APPOINTMENT (OUTPATIENT)
Dept: PLASTIC SURGERY | Facility: CLINIC | Age: 73
End: 2023-10-19
Payer: MEDICARE

## 2023-10-19 VITALS — HEART RATE: 66 BPM | OXYGEN SATURATION: 99 %

## 2023-10-19 PROBLEM — E04.2 NONTOXIC MULTINODULAR GOITER: Chronic | Status: ACTIVE | Noted: 2023-10-05

## 2023-10-19 PROBLEM — I63.9 CEREBRAL INFARCTION, UNSPECIFIED: Chronic | Status: ACTIVE | Noted: 2023-10-05

## 2023-10-19 PROCEDURE — 99024 POSTOP FOLLOW-UP VISIT: CPT

## 2023-10-20 ENCOUNTER — APPOINTMENT (OUTPATIENT)
Dept: BREAST CENTER | Facility: CLINIC | Age: 73
End: 2023-10-20
Payer: MEDICARE

## 2023-10-20 VITALS
WEIGHT: 118 LBS | HEART RATE: 61 BPM | SYSTOLIC BLOOD PRESSURE: 141 MMHG | HEIGHT: 59 IN | DIASTOLIC BLOOD PRESSURE: 77 MMHG | BODY MASS INDEX: 23.79 KG/M2

## 2023-10-20 PROCEDURE — 99024 POSTOP FOLLOW-UP VISIT: CPT

## 2023-10-23 ENCOUNTER — TRANSCRIPTION ENCOUNTER (OUTPATIENT)
Age: 73
End: 2023-10-23

## 2023-10-24 ENCOUNTER — NON-APPOINTMENT (OUTPATIENT)
Age: 73
End: 2023-10-24

## 2023-10-26 ENCOUNTER — APPOINTMENT (OUTPATIENT)
Dept: PLASTIC SURGERY | Facility: CLINIC | Age: 73
End: 2023-10-26
Payer: MEDICARE

## 2023-10-26 PROCEDURE — 99024 POSTOP FOLLOW-UP VISIT: CPT

## 2023-11-09 ENCOUNTER — APPOINTMENT (OUTPATIENT)
Dept: HEMATOLOGY ONCOLOGY | Facility: CLINIC | Age: 73
End: 2023-11-09
Payer: MEDICARE

## 2023-11-09 VITALS
OXYGEN SATURATION: 99 % | HEART RATE: 62 BPM | WEIGHT: 121 LBS | SYSTOLIC BLOOD PRESSURE: 134 MMHG | DIASTOLIC BLOOD PRESSURE: 73 MMHG | HEIGHT: 59 IN | RESPIRATION RATE: 18 BRPM | TEMPERATURE: 98.5 F | BODY MASS INDEX: 24.39 KG/M2

## 2023-11-09 PROCEDURE — 99204 OFFICE O/P NEW MOD 45 MIN: CPT

## 2023-11-09 RX ORDER — ACETAMINOPHEN 325 MG/1
TABLET, FILM COATED ORAL
Refills: 0 | Status: ACTIVE | COMMUNITY

## 2023-11-13 ENCOUNTER — APPOINTMENT (OUTPATIENT)
Dept: RADIATION ONCOLOGY | Facility: CLINIC | Age: 73
End: 2023-11-13
Payer: MEDICARE

## 2023-11-13 VITALS
DIASTOLIC BLOOD PRESSURE: 76 MMHG | HEART RATE: 60 BPM | SYSTOLIC BLOOD PRESSURE: 130 MMHG | RESPIRATION RATE: 16 BRPM | TEMPERATURE: 97.9 F | OXYGEN SATURATION: 100 %

## 2023-11-13 PROCEDURE — 99203 OFFICE O/P NEW LOW 30 MIN: CPT

## 2023-11-13 RX ORDER — MULTIVITAMIN
TABLET ORAL
Refills: 0 | Status: ACTIVE | COMMUNITY

## 2023-11-16 ENCOUNTER — APPOINTMENT (OUTPATIENT)
Dept: PLASTIC SURGERY | Facility: CLINIC | Age: 73
End: 2023-11-16
Payer: MEDICARE

## 2023-11-16 PROCEDURE — 99024 POSTOP FOLLOW-UP VISIT: CPT

## 2023-11-28 ENCOUNTER — OUTPATIENT (OUTPATIENT)
Dept: OUTPATIENT SERVICES | Facility: HOSPITAL | Age: 73
LOS: 1 days | End: 2023-11-28
Payer: MEDICARE

## 2023-11-28 ENCOUNTER — APPOINTMENT (OUTPATIENT)
Dept: RADIOLOGY | Facility: HOSPITAL | Age: 73
End: 2023-11-28
Payer: MEDICARE

## 2023-11-28 DIAGNOSIS — Z41.9 ENCOUNTER FOR PROCEDURE FOR PURPOSES OTHER THAN REMEDYING HEALTH STATE, UNSPECIFIED: Chronic | ICD-10-CM

## 2023-11-28 PROCEDURE — 77080 DXA BONE DENSITY AXIAL: CPT

## 2023-11-28 PROCEDURE — 77080 DXA BONE DENSITY AXIAL: CPT | Mod: 26

## 2023-12-07 ENCOUNTER — NON-APPOINTMENT (OUTPATIENT)
Age: 73
End: 2023-12-07

## 2023-12-14 ENCOUNTER — APPOINTMENT (OUTPATIENT)
Dept: PLASTIC SURGERY | Facility: CLINIC | Age: 73
End: 2023-12-14
Payer: MEDICARE

## 2023-12-14 PROCEDURE — 99024 POSTOP FOLLOW-UP VISIT: CPT

## 2023-12-14 NOTE — HISTORY OF PRESENT ILLNESS
[FreeTextEntry1] : 74 y/o female presents 2 months s/p right breast reconstruction utilizing tissue expander on 10/06/2023. Denies any f/c/n/v. Patient is taking tylenol for pain. Patient c/o generalized pressure and discomfort to right breast and "pimple". She has not started radiation; she believes she will start next week.  PE: right TE in place, incision well healed, no collections or s/sx of infection. tissue expander folded at 12 o clock creating pinpoint pressure necrosis, no exposure Procedure Details: Injected 3 cc local 1% epinephrine/lidocaine anesthetic to surrounding tissue. Prepped and draped in sterile fashion. Bolster w/ xeroform sutured with 3-0 Nylon on the skin. Tolerated procedure well. Sports bra Continue ROM exercises RTC on Monday to evaluate bolster prior to beginning RT

## 2023-12-18 ENCOUNTER — APPOINTMENT (OUTPATIENT)
Dept: PLASTIC SURGERY | Facility: CLINIC | Age: 73
End: 2023-12-18

## 2023-12-18 NOTE — HISTORY OF PRESENT ILLNESS
[FreeTextEntry1] : 72 y/o female presents 2 months s/p right breast reconstruction utilizing tissue expander on 10/06/2023. Denies any f/c/n/v. Patient is taking tylenol for pain, reports improvement in pain overall. Patient is here for suture removal. She has not started radiation.  PE: right TE in place, incision well healed, no collections or s/sx of infection. tissue expander folded at 12 o clock creating pinpoint pressure necrosis - skin currently preserved with mattress bolster off loading this area, no exposure Bolster w/ xeroform sutured with 3-0 Nylon on the skin left in place  Sports bra ROM exercises Will follow with Rad Onc to confirm ok to proceed with treatment

## 2023-12-21 ENCOUNTER — APPOINTMENT (OUTPATIENT)
Dept: PLASTIC SURGERY | Facility: CLINIC | Age: 73
End: 2023-12-21
Payer: MEDICARE

## 2023-12-21 PROCEDURE — 99024 POSTOP FOLLOW-UP VISIT: CPT

## 2023-12-21 NOTE — HISTORY OF PRESENT ILLNESS
[FreeTextEntry1] : 72 y/o female presents 2 months s/p right breast reconstruction utilizing tissue expander on 10/06/2023. Denies any f/c/n/v. Patient is taking tylenol for pain, reports improvement in pain overall. Patient is here for suture removal. She has not started radiation.  PE: right TE in place, incision well healed, no collections or s/sx of infection. tissue expander folded at 12 o clock creating pinpoint pressure necrosis - skin recovered and preserved s/p mattress bolster off loading this area, no exposure Steri strips replaced at bolster site  Sports bra ROM exercises OK to begin RT. Can remove steri strips for treatment if needed. Otherwise can leave in place.

## 2023-12-31 PROBLEM — Z23 NEED FOR 23-POLYVALENT PNEUMOCOCCAL POLYSACCHARIDE VACCINE: Status: RESOLVED | Noted: 2020-10-19 | Resolved: 2021-04-12

## 2023-12-31 PROBLEM — Z23 NEED FOR VACCINATION WITH 13-POLYVALENT PNEUMOCOCCAL CONJUGATE VACCINE: Status: RESOLVED | Noted: 2019-05-10 | Resolved: 2023-12-31

## 2024-01-03 ENCOUNTER — NON-APPOINTMENT (OUTPATIENT)
Age: 74
End: 2024-01-03

## 2024-01-09 ENCOUNTER — NON-APPOINTMENT (OUTPATIENT)
Age: 74
End: 2024-01-09

## 2024-01-16 ENCOUNTER — NON-APPOINTMENT (OUTPATIENT)
Age: 74
End: 2024-01-16

## 2024-01-16 NOTE — REVIEW OF SYSTEMS
[Esophagitis: Grade 0] : Esophagitis: Grade 0 [Nausea: Grade 0] : Nausea: Grade 0 [Fatigue: Grade 1 - Fatigue relieved by rest] : Fatigue: Grade 1 - Fatigue relieved by rest [Breast Pain: Grade 1 - Mild pain] : Breast Pain: Grade 1 - Mild pain [Anxiety: Grade 0] : Anxiety: Grade 0  [Depression: Grade 0] : Depression: Grade 0 [Insomnia: Grade 0] : Insomnia: Grade 0 [Pruritus: Grade 0] : Pruritus: Grade 0 [Skin Atrophy: Grade 0] : Skin Atrophy: Grade 0 [Skin Induration: Grade 0] : Skin Induration: Grade 0 [Dermatitis Radiation: Grade 0] : Dermatitis Radiation: Grade 0

## 2024-01-16 NOTE — VITALS
[Maximal Pain Intensity: 5/10] : 5/10 [Least Pain Intensity: 0/10] : 0/10 [Pain Description/Quality: ___] : Pain description/quality: [unfilled] [Pain Duration: ___] : Pain duration: [unfilled] [Pain Location: ___] : Pain Location: [unfilled] [Pain Interferes with ADLs] : Pain does not interfere with activities of daily living [OTC] : OTC [90: Able to carry normal activity; minor signs or symptoms of disease.] : 90: Able to carry normal activity; minor signs or symptoms of disease.  [90: Minor restrictions in physically strenous activity.] : 90: Minor restrictions in physically strenuous activity. [ECOG Performance Status: 1 - Restricted in physically strenuous activity but ambulatory and able to carry out work of a light or sedentary nature] : Performance Status: 1 - Restricted in physically strenuous activity but ambulatory and able to carry out work of a light or sedentary nature, e.g., light house work, office work

## 2024-01-16 NOTE — HISTORY OF PRESENT ILLNESS
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  OTV APPTS: 1/16/24 - OTV - Patient has completed 2000/5000 cGy of radiation to the right chest wall. Today, patient endorses stable pain to right breast expander, patient has appointment with surgeon this thursday. Patient denies skin changes. She endorses mild fatigue relieved by naps. Patient to continue radiation.   1/9/24 - OTV - Ms. Marquez has completed 1200 cGy / 5000 cGy to the chest wall.  Today, patient endorses some pain with the expander on the right breast, patient is seeing the surgeon next week and plans to discuss. Denies skin irritation, s/s of esophagitis. endorses mild fatigue. patient to continue radiation.  ________________________________ ONCOLOGIC HISTORY Ms. 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 Duran and Dr. Purdy. Annual screening mammo on 7/6/23 showed a new mass with spiculated margins in the UOQ of the right breast, 2-3 cm from the nipple.  On right breast targeted mammo 8/14/23 there was a 1.9 x 1.6 x 2.4 cm lobulated mass at RIGHT breast 10:00 1cmfn and a  0.6 x 0.5 x 0.7  cm hypoechoic nodule with indistinct margins in the retroareolar RIGHT 6:00 position. 8/31/23 biopsy Site 1 - RIGHT BREAST 10:00 1 CM FN (ribbon clip) patho resulted as Poorly differentiated invasive ductal carcinoma  measuring 0.8 cm in maximal length in this material, focally involving a fibroadenoma. ER >90% NV >90% Her-2 negative Ki-67 32% Site 2 - RIGHT BREAST 6:00, RETROAREOLAR (heart clip), patho resulted as moderately differentiated invasive ductal carcinoma  measuring 1.0 cm in maximal length in this material. ER >90% NV >90% Her-2 negative Ki-67 14%.  On 10/6/23 patient underwent a R modified radical mastectomy, R SLNB, ALND, and right breast reconstruction utilizing tissue expander with Dr. Duran and Dr. Purdy.  Final path resulted as  5cm RIGHT IDC poorly differentiated; DCIS with intermediate to high nuclear grade, cribriform, solid and necrosis; all margins greater than 5mm positive for extensive lymphovascular invasion; perineural invasion identified; fibroadenoma involved by invasive and in situ carcinoma; prior procedure site changes ; margins negative; All margins negative for invasive carcinoma and Distance from Invasive Carcinoma to Closest Margin is Greater than 3 mm, All margins negative for DCIS and Distance from DCIS to Closest Margin is Greater than 5 mm; (1/7) lymph nodes positive for tumor (metastatic deposit measures at least 4mm), extranodal extension is present.  11/3/23 Oncotype: 14 Patient undergoing TE fills with Dr. Purdy.  11/13/23 NEW: Today the patient presents with her  and sister.  She reports feeling well overall.  She notes some discomfort in her right axilla postoperatively rated 1-3, taking OTC medications.  She also notes mild constipation recently.  She denies other complaints to include fevers, chills, dyspnea, chest pain, palpitations, nausea, vomiting, diarrhea.  Of note, she lives on the [facility].  She is an artist.  She denies h/o smoking and drug use.  Occasional alcohol.   __________________________________________________________________________________________ 7/6/23 ([facility]) B/L sMMG: heterogeneously dense. -There is a new mass with spiculated margins in the UOQ of the right breast, 2-3 cm from the nipple. -There are stable bilateral circumscribed masses are noted in each breast, supporting a benign nature. FOLLOW-UP: Diagnostic mammography and targeted breast ultrasound BI-RADS 0: Incomplete.  8/14/23 ([facility]) R DX MMG & Targeted R US: heterogeneously dense. 1. 1.9 x 1.6 x 2.4 cm lobulated suspicious mass in the 10:00 position of the right breast 1cmfn that corresponds to the persistent finding demonstrated mammographically. 2. Indeterminate a 0.6 x 0.5 x 0.7 cm hypoechoic nodule with indistinct margins in the retroareolar right breast, 6:00 position 3. 0.5 x 0.5 x 0.5cm circumcised hypoechoic nodule in the 3:00 position 1cmfn of the right breast   Additional nodules, see full report FOLLOW-UP: Ultrasound guided biopsy x2. BI-RADS 4: Suspicious.  8/31/23 ([facility]) US-guided biopsy x 2 Site 1 - RIGHT BREAST 10:00 1 CM FN (ribbon clip): Poorly differentiated invasive ductal carcinoma (tubule formation 3/3, nuclear pleomorphism 3/3, mitotic counts 2/3), measuring 0.8 cm in maximal length in this material, focally involving a fibroadenoma. ER >90% NV >90% Her-2 negative Ki-67 32% Site 2 - RIGHT BREAST 6:00, RETROAREOLAR (heart clip): Moderately differentiated invasive ductal carcinoma (tubule formation 3/3, nuclear pleomorphism 2/3, mitotic counts 1/3), measuring 1.0 cm in maximal length in this material. ER >90% NV >90% Her-2 negative Ki-67 14% Malignant, concordant. Breast surgical consultation for definitive management is recommended.  9/15/23 patho 1. Right Breast, 10:00, 1cm FN: -   Invasive poorly differentiated ductal carcinoma, 8 mm in greatest  dimension, adjacent to fibroadenoma. -   Focal ductal carcinoma in situ, high-grade, solid type. As per outside report the invasive carcinoma is positive for ER (3+90%, and NV (3+ 90%).  HER2 is negative (1+).  Ki-67 is 32%.  2. Right Breast, 6:00, retroareolar: -   Invasive moderately differentiated ductal carcinoma, 10 mm in greatest dimension. As per outside report the invasive carcinoma is positive for ER (3+90%, and NV (3+ 90%).  HER2 is negative (1+).  Ki-67 is 14%.  10/5/2023 Lymphoscintigraphy - Breast Right Impression: Successful identification of a right axillary sentinel lymph node.  10/6/23 Surgical patho Right breast mastectomy w/ SLNBx and right breast reconstruction with placement of tissue expander By Dr. Duran and Dr. Purdy Specimen(s) Submitted 1. Right sentinel node #1 [id number] 2. Right mastectomy 3. Anterior superior suture marks, new anterior margin 4. Right axillary contents 5. Right medial margin suture marks new anterior margin  Final Diagnosis  1.  [place] lymph node #1: - One lymph node positive for tumor (1/1). - Metastatic deposit measures at least 4 mm. - Extranodal extension is present.  2.  Breast, right, mastectomy: - Invasive ductal carcinoma, poorly differentiated, measuring approximately 5 cm. 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 - Margins, as present on this specimen: Invasive carcinoma: Anterior (LIQ): 3 mm Posterior: > 5 mm DCIS: All margins > 5 mm - Positive for lymphovascular invasion. - Perineural invasion is identified. -  Fibroadenoma involved by invasive and in situ carcinoma. - Prior procedure site changes.  3.  Breast, anterior superior margin: - Benign fibroadipose tissue and skeletal muscle.  4.  Axillary contents, right: -  Six lymph node negative for tumor (0/6).  5.  Breast, right, medial margin: - Benign breast tissue.  Synoptic Summary 2: Breast Invasive Carcinoma - Resection Specimen Procedure:  Total mastectomy Specimen Laterality:   Right Tumor Tumor Site:  Upper outer quadrant;  Lower outer quadrant Invasive carcinoma of no special type (ductal) Histologic Type: Histologic Grade ([place] Histologic Score): Glandular (Acinar) / Tubular Differentiation:    Score 3 Nuclear Pleomorphism:   Score 2 Mitotic Rate:   Score 3 Overall Grade:   Grade 3 (scores of 8 or 9) Tumor Size:  50 Millimeters (mm) Tumor Focality:   Single focus of invasive carcinoma Ductal Carcinoma In Situ (DCIS):    Present Architectural Patterns:   Cribriform;  Solid Nuclear Grade:   Grade III (high) Necrosis:  Present, central (expansive "comedo" necrosis) Lobular Carcinoma In Situ (LCIS):    Not identified Tumor Extent Tumor Extent:   Skin is present and involved Skin Invasion:  Carcinoma directly invades into the dermis or epidermis without skin ulceration (this does not change the T classification) Skin Satellite Foci:   Satellite foci not identified Lymphatic and / or Vascular Invasion:    Present - Extensive Dermal Lymphatic and / or Vascular Invasion:    Not identified Treatment Effect in the Breast:   No known presurgical therapy Margins Margin Status for Invasive Carcinoma:    All margins negative for invasive carcinoma Distance from Invasive Carcinoma to Closest Margin:    Greater than 3 mm Margin Status for DCIS:   All margins negative for DCIS Distance from DCIS to Closest Margin:    Greater than 5 mm Regional Lymph Nodes Regional Lymph Node Status:   Tumor present in regional lymph node(s) Number of Lymph Nodes with Macrometastases:    1 Number of Lymph Nodes with Micrometastases:    0 Size of Largest [place] Metastatic Deposit:    4 mm Extranodal Extension:   Present, 2 mm or less Total Number of Lymph Nodes Examined (sentinel and non-sentinel): 7 Number of [place] Nodes Examined:    1 pTNM Classification (AJCC 8th Edition) pT Category:   pT2 pN Category:   pN1a

## 2024-01-16 NOTE — DISEASE MANAGEMENT
[Pathological] : TNM Stage: p [II] : II [TTNM] : 2 [NTNM] : 1a [MTNM] : x [de-identified] : 2000 [de-identified] : 8841 [de-identified] : right chest wall

## 2024-01-18 ENCOUNTER — APPOINTMENT (OUTPATIENT)
Dept: PLASTIC SURGERY | Facility: CLINIC | Age: 74
End: 2024-01-18
Payer: MEDICARE

## 2024-01-18 DIAGNOSIS — G89.18 OTHER ACUTE POSTPROCEDURAL PAIN: ICD-10-CM

## 2024-01-18 PROCEDURE — 99212 OFFICE O/P EST SF 10 MIN: CPT

## 2024-01-18 NOTE — HISTORY OF PRESENT ILLNESS
[FreeTextEntry1] : 72 y/o female presents 3 months s/p right breast reconstruction utilizing tissue expander on 10/06/2023. Denies any f/c/n/v. Patient is taking advil for pain, reports intermittent pain. She started radiation on 01/02/2024 - 12/25 sessions completed. PE: right TE in place, incision well healed, no collections or s/sx of infection. tissue expander folded at 12 o clock - skin recovered and preserved s/p mattress bolster off loading this area, no exposure Steri strips removed Massage Rx for gabapentin given  Continue sports bra/ gauze  RTC 1 week post last radiation treatment

## 2024-01-22 PROBLEM — Z80.3 FAMILY HISTORY OF MALIGNANT NEOPLASM OF FEMALE BREAST: Status: ACTIVE | Noted: 2023-11-13

## 2024-01-23 ENCOUNTER — NON-APPOINTMENT (OUTPATIENT)
Age: 74
End: 2024-01-23

## 2024-01-23 NOTE — HISTORY OF PRESENT ILLNESS
[FreeTextEntry1] : This is a 73 y.o female withpT2(5cm)N1a(+1 +RADHA/7)Mx  UOQ, Right breast poorly differentiated IDC s/p R modified radical mastectomy, R SLNB, ALND, and right breast reconstruction utilizing tissue expander on 10/6/23 ER >90% NM >90% Her-2 negative Ki-67 32%, all margins negative.  OTV APPTS: 1/23/24 - OTV - Patient has completed 3000 cGy / 5000 cGy to the right chest wall.  Patient finds pain relief of RIGHT breast expander with gabapentin rating it a 2/10.  Otherwise, she denies chest pain, sob, discoloration, dimpling, or nodules. Patient to continue mometasone and Aquaphor 2x a day.  Patient to continue radiation.  1/16/24 - OTV - Patient has completed 2000/5000 cGy of radiation to the right chest wall. Today, patient endorses stable pain to right breast expander, patient has appointment with surgeon this thursday. Patient denies skin changes. She endorses mild fatigue relieved by naps. Patient to continue radiation.   1/9/24 - OTV - Ms. Marquez has completed 1200 cGy / 5000 cGy to the chest wall.  Today, patient endorses some pain with the expander on the right breast, patient is seeing the surgeon next week and plans to discuss. Denies skin irritation, s/s of esophagitis. endorses mild fatigue. patient to continue radiation.  ________________________________ ONCOLOGIC HISTORY Ms. 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 Duran and Dr. Purdy. Annual screening mammo on 7/6/23 showed a new mass with spiculated margins in the UOQ of the right breast, 2-3 cm from the nipple.  On right breast targeted mammo 8/14/23 there was a 1.9 x 1.6 x 2.4 cm lobulated mass at RIGHT breast 10:00 1cmfn and a  0.6 x 0.5 x 0.7  cm hypoechoic nodule with indistinct margins in the retroareolar RIGHT 6:00 position. 8/31/23 biopsy Site 1 - RIGHT BREAST 10:00 1 CM FN (ribbon clip) patho resulted as Poorly differentiated invasive ductal carcinoma  measuring 0.8 cm in maximal length in this material, focally involving a fibroadenoma. ER >90% NM >90% Her-2 negative Ki-67 32% Site 2 - RIGHT BREAST 6:00, RETROAREOLAR (heart clip), patho resulted as moderately differentiated invasive ductal carcinoma  measuring 1.0 cm in maximal length in this material. ER >90% NM >90% Her-2 negative Ki-67 14%.  On 10/6/23 patient underwent a R modified radical mastectomy, R SLNB, ALND, and right breast reconstruction utilizing tissue expander with Dr. Duran and Dr. Purdy.  Final path resulted as  5cm RIGHT IDC poorly differentiated; DCIS with intermediate to high nuclear grade, cribriform, solid and necrosis; all margins greater than 5mm positive for extensive lymphovascular invasion; perineural invasion identified; fibroadenoma involved by invasive and in situ carcinoma; prior procedure site changes ; margins negative; All margins negative for invasive carcinoma and Distance from Invasive Carcinoma to Closest Margin is Greater than 3 mm, All margins negative for DCIS and Distance from DCIS to Closest Margin is Greater than 5 mm; (1/7) lymph nodes positive for tumor (metastatic deposit measures at least 4mm), extranodal extension is present.  11/3/23 Oncotype: 14 Patient undergoing TE fills with Dr. Purdy.  11/13/23 NEW: Today the patient presents with her  and sister.  She reports feeling well overall.  She notes some discomfort in her right axilla postoperatively rated 1-3, taking OTC medications.  She also notes mild constipation recently.  She denies other complaints to include fevers, chills, dyspnea, chest pain, palpitations, nausea, vomiting, diarrhea.  Of note, she lives on the [facility].  She is an artist.  She denies h/o smoking and drug use.  Occasional alcohol.   __________________________________________________________________________________________ 7/6/23 (R) B/L sMMG: heterogeneously dense. -There is a new mass with spiculated margins in the UOQ of the right breast, 2-3 cm from the nipple. -There are stable bilateral circumscribed masses are noted in each breast, supporting a benign nature. FOLLOW-UP: Diagnostic mammography and targeted breast ultrasound BI-RADS 0: Incomplete.  8/14/23 (R) R DX MMG & Targeted R US: heterogeneously dense. 1. 1.9 x 1.6 x 2.4 cm lobulated suspicious mass in the 10:00 position of the right breast 1cmfn that corresponds to the persistent finding demonstrated mammographically. 2. Indeterminate a 0.6 x 0.5 x 0.7 cm hypoechoic nodule with indistinct margins in the retroareolar right breast, 6:00 position 3. 0.5 x 0.5 x 0.5cm circumcised hypoechoic nodule in the 3:00 position 1cmfn of the right breast   Additional nodules, see full report FOLLOW-UP: Ultrasound guided biopsy x2. BI-RADS 4: Suspicious.  8/31/23 ([facility]/CBL Path) US-guided biopsy x 2 Site 1 - RIGHT BREAST 10:00 1 CM FN (ribbon clip): Poorly differentiated invasive ductal carcinoma (tubule formation 3/3, nuclear pleomorphism 3/3, mitotic counts 2/3), measuring 0.8 cm in maximal length in this material, focally involving a fibroadenoma. ER >90% NM >90% Her-2 negative Ki-67 32% Site 2 - RIGHT BREAST 6:00, RETROAREOLAR (heart clip): Moderately differentiated invasive ductal carcinoma (tubule formation 3/3, nuclear pleomorphism 2/3, mitotic counts 1/3), measuring 1.0 cm in maximal length in this material. ER >90% NM >90% Her-2 negative Ki-67 14% Malignant, concordant. Breast surgical consultation for definitive management is recommended.  9/15/23 patho 1. Right Breast, 10:00, 1cm FN: -   Invasive poorly differentiated ductal carcinoma, 8 mm in greatest  dimension, adjacent to fibroadenoma. -   Focal ductal carcinoma in situ, high-grade, solid type. As per outside report the invasive carcinoma is positive for ER (3+90%, and NM (3+ 90%).  HER2 is negative (1+).  Ki-67 is 32%.  2. Right Breast, 6:00, retroareolar: -   Invasive moderately differentiated ductal carcinoma, 10 mm in greatest dimension. As per outside report the invasive carcinoma is positive for ER (3+90%, and NM (3+ 90%).  HER2 is negative (1+).  Ki-67 is 14%.  10/5/2023 Lymphoscintigraphy - Breast Right Impression: Successful identification of a right axillary sentinel lymph node.  10/6/23 Surgical patho Right breast mastectomy w/ SLNBx and right breast reconstruction with placement of tissue expander By Dr. Duran and Dr. Purdy Specimen(s) Submitted 1. Right sentinel node #1 [id number] 2. Right mastectomy 3. Anterior superior suture marks, new anterior margin 4. Right axillary contents 5. Right medial margin suture marks new anterior margin  Final Diagnosis  1.  [place] lymph node #1: - One lymph node positive for tumor (1/1). - Metastatic deposit measures at least 4 mm. - Extranodal extension is present.  2.  Breast, right, mastectomy: - Invasive ductal carcinoma, poorly differentiated, measuring approximately 5 cm. 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 - Margins, as present on this specimen: Invasive carcinoma: Anterior (LIQ): 3 mm Posterior: > 5 mm DCIS: All margins > 5 mm - Positive for lymphovascular invasion. - Perineural invasion is identified. -  Fibroadenoma involved by invasive and in situ carcinoma. - Prior procedure site changes.  3.  Breast, anterior superior margin: - Benign fibroadipose tissue and skeletal muscle.  4.  Axillary contents, right: -  Six lymph node negative for tumor (0/6).  5.  Breast, right, medial margin: - Benign breast tissue.  Synoptic Summary 2: Breast Invasive Carcinoma - Resection Specimen Procedure:  Total mastectomy Specimen Laterality:   Right Tumor Tumor Site:  Upper outer quadrant;  Lower outer quadrant Invasive carcinoma of no special type (ductal) Histologic Type: Histologic Grade (Berna Histologic Score): Glandular (Acinar) / Tubular Differentiation:    Score 3 Nuclear Pleomorphism:   Score 2 Mitotic Rate:   Score 3 Overall Grade:   Grade 3 (scores of 8 or 9) Tumor Size:  50 Millimeters (mm) Tumor Focality:   Single focus of invasive carcinoma Ductal Carcinoma In Situ (DCIS):    Present Architectural Patterns:   Cribriform;  Solid Nuclear Grade:   Grade III (high) Necrosis:  Present, central (expansive "comedo" necrosis) Lobular Carcinoma In Situ (LCIS):    Not identified Tumor Extent Tumor Extent:   Skin is present and involved Skin Invasion:  Carcinoma directly invades into the dermis or epidermis without skin ulceration (this does not change the T classification) Skin Satellite Foci:   Satellite foci not identified Lymphatic and / or Vascular Invasion:    Present - Extensive Dermal Lymphatic and / or Vascular Invasion:    Not identified Treatment Effect in the Breast:   No known presurgical therapy Margins Margin Status for Invasive Carcinoma:    All margins negative for invasive carcinoma Distance from Invasive Carcinoma to Closest Margin:    Greater than 3 mm Margin Status for DCIS:   All margins negative for DCIS Distance from DCIS to Closest Margin:    Greater than 5 mm Regional Lymph Nodes Regional Lymph Node Status:   Tumor present in regional lymph node(s) Number of Lymph Nodes with Macrometastases:    1 Number of Lymph Nodes with Micrometastases:    0 Size of Largest [place] Metastatic Deposit:    4 mm Extranodal Extension:   Present, 2 mm or less Total Number of Lymph Nodes Examined (sentinel and non-sentinel): 7 Number of [place] Nodes Examined:    1 pTNM Classification (AJCC 8th Edition) pT Category:   pT2 pN Category:   pN1a

## 2024-01-23 NOTE — VITALS
[Maximal Pain Intensity: 5/10] : 5/10 [Least Pain Intensity: 0/10] : 0/10 [Pain Description/Quality: ___] : Pain description/quality: [unfilled] [Pain Duration: ___] : Pain duration: [unfilled] [Pain Location: ___] : Pain Location: [unfilled] [OTC] : OTC [90: Able to carry normal activity; minor signs or symptoms of disease.] : 90: Able to carry normal activity; minor signs or symptoms of disease.  [90: Minor restrictions in physically strenous activity.] : 90: Minor restrictions in physically strenuous activity. [ECOG Performance Status: 1 - Restricted in physically strenuous activity but ambulatory and able to carry out work of a light or sedentary nature] : Performance Status: 1 - Restricted in physically strenuous activity but ambulatory and able to carry out work of a light or sedentary nature, e.g., light house work, office work [Maximal Pain Intensity: 2/10] : 2/10 [Pain Interferes with ADLs] : Pain does not interfere with activities of daily living [NSAID/Non-Opioid] : NSAID/Non-Opioid [0 - No Distress] : Distress Level: 0

## 2024-01-23 NOTE — REVIEW OF SYSTEMS
[Esophagitis: Grade 0] : Esophagitis: Grade 0 [Nausea: Grade 0] : Nausea: Grade 0 [Fatigue: Grade 1 - Fatigue relieved by rest] : Fatigue: Grade 1 - Fatigue relieved by rest [Breast Pain: Grade 1 - Mild pain] : Breast Pain: Grade 1 - Mild pain [Anxiety: Grade 0] : Anxiety: Grade 0  [Depression: Grade 0] : Depression: Grade 0 [Insomnia: Grade 0] : Insomnia: Grade 0 [Pruritus: Grade 0] : Pruritus: Grade 0 [Skin Atrophy: Grade 0] : Skin Atrophy: Grade 0 [Skin Induration: Grade 0] : Skin Induration: Grade 0 [Dermatitis Radiation: Grade 0] : Dermatitis Radiation: Grade 0 [Vomiting: Grade 0] : Vomiting: Grade 0 [FreeTextEntry7] : relieved with gabapentin

## 2024-01-23 NOTE — DISEASE MANAGEMENT
[Pathological] : TNM Stage: p [II] : II [TTNM] : 2 [NTNM] : 1a [MTNM] : x [de-identified] : 2000 [de-identified] : 6860 [de-identified] : right chest wall Normal

## 2024-01-24 ENCOUNTER — APPOINTMENT (OUTPATIENT)
Dept: BREAST CENTER | Facility: CLINIC | Age: 74
End: 2024-01-24

## 2024-01-24 ENCOUNTER — NON-APPOINTMENT (OUTPATIENT)
Age: 74
End: 2024-01-24

## 2024-01-24 DIAGNOSIS — Z80.3 FAMILY HISTORY OF MALIGNANT NEOPLASM OF BREAST: ICD-10-CM

## 2024-01-30 ENCOUNTER — NON-APPOINTMENT (OUTPATIENT)
Age: 74
End: 2024-01-30

## 2024-01-30 NOTE — REVIEW OF SYSTEMS
[Diarrhea: Grade 0] : Diarrhea: Grade 0 [Fatigue: Grade 1 - Fatigue relieved by rest] : Fatigue: Grade 1 - Fatigue relieved by rest [Breast Pain: Grade 1 - Mild pain] : Breast Pain: Grade 1 - Mild pain [Anxiety: Grade 0] : Anxiety: Grade 0  [Depression: Grade 0] : Depression: Grade 0 [Insomnia: Grade 0] : Insomnia: Grade 0 [Dermatitis Radiation: Grade 1 - Faint erythema or dry desquamation] : Dermatitis Radiation: Grade 1 - Faint erythema or dry desquamation

## 2024-01-30 NOTE — VITALS
[Maximal Pain Intensity: 3/10] : 3/10 [Least Pain Intensity: 0/10] : 0/10 [90: Able to carry normal activity; minor signs or symptoms of disease.] : 90: Able to carry normal activity; minor signs or symptoms of disease.  [90: Minor restrictions in physically strenous activity.] : 90: Minor restrictions in physically strenuous activity. [ECOG Performance Status: 1 - Restricted in physically strenuous activity but ambulatory and able to carry out work of a light or sedentary nature] : Performance Status: 1 - Restricted in physically strenuous activity but ambulatory and able to carry out work of a light or sedentary nature, e.g., light house work, office work

## 2024-01-30 NOTE — DISEASE MANAGEMENT
[Pathological] : TNM Stage: p [TTNM] : 2 [NTNM] : 1a [MTNM] : x [II] : II [de-identified] : 1060 [de-identified] : 5852 [de-identified] : right chest wall

## 2024-01-30 NOTE — HISTORY OF PRESENT ILLNESS
[FreeTextEntry1] : This is a 73 y.o female withpT2(5cm)N1a(+1 +RADHA/7)Mx  UOQ, Right breast poorly differentiated IDC s/p R modified radical mastectomy, R SLNB, ALND, and right breast reconstruction utilizing tissue expander on 10/6/23 ER >90% UT >90% Her-2 negative Ki-67 32%, all margins negative.  OTV APPTS: 1/30/24 - OTV - Patient has completed 4000 cGy / 5000 cGy to the right chest wall.  Patient finds pain relief of RIGHT breast expander with gabapentin rating it a 3/10.  Otherwise, she denies chest pain, sob, discoloration, dimpling, or nodules. Patient to continue mometasone and Aquaphor 2x a day.  Patient to continue radiation.  1/23/24 - OTV - Patient has completed 3000 cGy / 5000 cGy to the right chest wall.  Patient finds pain relief of RIGHT breast expander with gabapentin rating it a 2/10.  Otherwise, she denies chest pain, sob, discoloration, dimpling, or nodules. Patient to continue mometasone and Aquaphor 2x a day.  Patient to continue radiation.  1/16/24 - OTV - Patient has completed 2000/5000 cGy of radiation to the right chest wall. Today, patient endorses stable pain to right breast expander, patient has appointment with surgeon this thursday. Patient denies skin changes. She endorses mild fatigue relieved by naps. Patient to continue radiation.   1/9/24 - OTV - Ms. Marquez has completed 1200 cGy / 5000 cGy to the chest wall.  Today, patient endorses some pain with the expander on the right breast, patient is seeing the surgeon next week and plans to discuss. Denies skin irritation, s/s of esophagitis. endorses mild fatigue. patient to continue radiation.  ________________________________ ONCOLOGIC HISTORY Ms. 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 Duran and Dr. Purdy. Annual screening mammo on 7/6/23 showed a new mass with spiculated margins in the UOQ of the right breast, 2-3 cm from the nipple.  On right breast targeted mammo 8/14/23 there was a 1.9 x 1.6 x 2.4 cm lobulated mass at RIGHT breast 10:00 1cmfn and a  0.6 x 0.5 x 0.7  cm hypoechoic nodule with indistinct margins in the retroareolar RIGHT 6:00 position. 8/31/23 biopsy Site 1 - RIGHT BREAST 10:00 1 CM FN (ribbon clip) patho resulted as Poorly differentiated invasive ductal carcinoma  measuring 0.8 cm in maximal length in this material, focally involving a fibroadenoma. ER >90% UT >90% Her-2 negative Ki-67 32% Site 2 - RIGHT BREAST 6:00, RETROAREOLAR (heart clip), patho resulted as moderately differentiated invasive ductal carcinoma  measuring 1.0 cm in maximal length in this material. ER >90% UT >90% Her-2 negative Ki-67 14%.  On 10/6/23 patient underwent a R modified radical mastectomy, R SLNB, ALND, and right breast reconstruction utilizing tissue expander with Dr. Duran and Dr. Purdy.  Final path resulted as  5cm RIGHT IDC poorly differentiated; DCIS with intermediate to high nuclear grade, cribriform, solid and necrosis; all margins greater than 5mm positive for extensive lymphovascular invasion; perineural invasion identified; fibroadenoma involved by invasive and in situ carcinoma; prior procedure site changes ; margins negative; All margins negative for invasive carcinoma and Distance from Invasive Carcinoma to Closest Margin is Greater than 3 mm, All margins negative for DCIS and Distance from DCIS to Closest Margin is Greater than 5 mm; (1/7) lymph nodes positive for tumor (metastatic deposit measures at least 4mm), extranodal extension is present.  11/3/23 Oncotype: 14 Patient undergoing TE fills with Dr. Purdy.  11/13/23 NEW: Today the patient presents with her  and sister.  She reports feeling well overall.  She notes some discomfort in her right axilla postoperatively rated 1-3, taking OTC medications.  She also notes mild constipation recently.  She denies other complaints to include fevers, chills, dyspnea, chest pain, palpitations, nausea, vomiting, diarrhea.  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  Occasional alcohol.   __________________________________________________________________________________________ 7/6/23 (R) B/L sMMG: heterogeneously dense. -There is a new mass with spiculated margins in the UOQ of the right breast, 2-3 cm from the nipple. -There are stable bilateral circumscribed masses are noted in each breast, supporting a benign nature. FOLLOW-UP: Diagnostic mammography and targeted breast ultrasound BI-RADS 0: Incomplete.  8/14/23 (R) R DX MMG & Targeted R US: heterogeneously dense. 1. 1.9 x 1.6 x 2.4 cm lobulated suspicious mass in the 10:00 position of the right breast 1cmfn that corresponds to the persistent finding demonstrated mammographically. 2. Indeterminate a 0.6 x 0.5 x 0.7 cm hypoechoic nodule with indistinct margins in the retroareolar right breast, 6:00 position 3. 0.5 x 0.5 x 0.5cm circumcised hypoechoic nodule in the 3:00 position 1cmfn of the right breast   Additional nodules, see full report FOLLOW-UP: Ultrasound guided biopsy x2. BI-RADS 4: Suspicious.  8/31/23 ([facility]/CBL Path) US-guided biopsy x 2 Site 1 - RIGHT BREAST 10:00 1 CM FN (ribbon clip): Poorly differentiated invasive ductal carcinoma (tubule formation 3/3, nuclear pleomorphism 3/3, mitotic counts 2/3), measuring 0.8 cm in maximal length in this material, focally involving a fibroadenoma. ER >90% UT >90% Her-2 negative Ki-67 32% Site 2 - RIGHT BREAST 6:00, RETROAREOLAR (heart clip): Moderately differentiated invasive ductal carcinoma (tubule formation 3/3, nuclear pleomorphism 2/3, mitotic counts 1/3), measuring 1.0 cm in maximal length in this material. ER >90% UT >90% Her-2 negative Ki-67 14% Malignant, concordant. Breast surgical consultation for definitive management is recommended.  9/15/23 patho 1. Right Breast, 10:00, 1cm FN: -   Invasive poorly differentiated ductal carcinoma, 8 mm in greatest  dimension, adjacent to fibroadenoma. -   Focal ductal carcinoma in situ, high-grade, solid type. As per outside report the invasive carcinoma is positive for ER (3+90%, and UT (3+ 90%).  HER2 is negative (1+).  Ki-67 is 32%.  2. Right Breast, 6:00, retroareolar: -   Invasive moderately differentiated ductal carcinoma, 10 mm in greatest dimension. As per outside report the invasive carcinoma is positive for ER (3+90%, and UT (3+ 90%).  HER2 is negative (1+).  Ki-67 is 14%.  10/5/2023 Lymphoscintigraphy - Breast Right Impression: Successful identification of a right axillary sentinel lymph node.  10/6/23 Surgical patho Right breast mastectomy w/ SLNBx and right breast reconstruction with placement of tissue expander By Dr. Duran and Dr. Purdy Specimen(s) Submitted 1. Right sentinel node #1 74 2. Right mastectomy 3. Anterior superior suture marks, new anterior margin 4. Right axillary contents 5. Right medial margin suture marks new anterior margin  Final Diagnosis  1.  [place] lymph node #1: - One lymph node positive for tumor (1/1). - Metastatic deposit measures at least 4 mm. - Extranodal extension is present.  2.  Breast, right, mastectomy: - Invasive ductal carcinoma, poorly differentiated, measuring approximately 5 cm. 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 - Margins, as present on this specimen: Invasive carcinoma: Anterior (LIQ): 3 mm Posterior: > 5 mm DCIS: All margins > 5 mm - Positive for lymphovascular invasion. - Perineural invasion is identified. -  Fibroadenoma involved by invasive and in situ carcinoma. - Prior procedure site changes.  3.  Breast, anterior superior margin: - Benign fibroadipose tissue and skeletal muscle.  4.  Axillary contents, right: -  Six lymph node negative for tumor (0/6).  5.  Breast, right, medial margin: - Benign breast tissue.  Synoptic Summary 2: Breast Invasive Carcinoma - Resection Specimen Procedure:  Total mastectomy Specimen Laterality:   Right Tumor Tumor Site:  Upper outer quadrant;  Lower outer quadrant Invasive carcinoma of no special type (ductal) Histologic Type: Histologic Grade (Vance Histologic Score): Glandular (Acinar) / Tubular Differentiation:    Score 3 Nuclear Pleomorphism:   Score 2 Mitotic Rate:   Score 3 Overall Grade:   Grade 3 (scores of 8 or 9) Tumor Size:  50 Millimeters (mm) Tumor Focality:   Single focus of invasive carcinoma Ductal Carcinoma In Situ (DCIS):    Present Architectural Patterns:   Cribriform;  Solid Nuclear Grade:   Grade III (high) Necrosis:  Present, central (expansive "comedo" necrosis) Lobular Carcinoma In Situ (LCIS):    Not identified Tumor Extent Tumor Extent:   Skin is present and involved Skin Invasion:  Carcinoma directly invades into the dermis or epidermis without skin ulceration (this does not change the T classification) Skin Satellite Foci:   Satellite foci not identified Lymphatic and / or Vascular Invasion:    Present - Extensive Dermal Lymphatic and / or Vascular Invasion:    Not identified Treatment Effect in the Breast:   No known presurgical therapy Margins Margin Status for Invasive Carcinoma:    All margins negative for invasive carcinoma Distance from Invasive Carcinoma to Closest Margin:    Greater than 3 mm Margin Status for DCIS:   All margins negative for DCIS Distance from DCIS to Closest Margin:    Greater than 5 mm Regional Lymph Nodes Regional Lymph Node Status:   Tumor present in regional lymph node(s) Number of Lymph Nodes with Macrometastases:    1 Number of Lymph Nodes with Micrometastases:    0 Size of Largest [place] Metastatic Deposit:    4 mm Extranodal Extension:   Present, 2 mm or less Total Number of Lymph Nodes Examined (sentinel and non-sentinel): 7 Number of [place] Nodes Examined:    1 pTNM Classification (AJCC 8th Edition) pT Category:   pT2 pN Category:   pN1a

## 2024-02-01 ENCOUNTER — APPOINTMENT (OUTPATIENT)
Dept: PLASTIC SURGERY | Facility: CLINIC | Age: 74
End: 2024-02-01

## 2024-02-06 ENCOUNTER — NON-APPOINTMENT (OUTPATIENT)
Age: 74
End: 2024-02-06

## 2024-02-06 NOTE — VITALS
[Maximal Pain Intensity: 4/10] : 4/10 [Least Pain Intensity: 0/10] : 0/10 [OTC] : OTC [90: Able to carry normal activity; minor signs or symptoms of disease.] : 90: Able to carry normal activity; minor signs or symptoms of disease.  [90: Minor restrictions in physically strenous activity.] : 90: Minor restrictions in physically strenuous activity. [ECOG Performance Status: 1 - Restricted in physically strenuous activity but ambulatory and able to carry out work of a light or sedentary nature] : Performance Status: 1 - Restricted in physically strenuous activity but ambulatory and able to carry out work of a light or sedentary nature, e.g., light house work, office work

## 2024-02-06 NOTE — DISEASE MANAGEMENT
[Pathological] : TNM Stage: p [TTNM] : 2 [NTNM] : 1a [MTNM] : x [II] : II [de-identified] : 3974 [de-identified] : 8120 [de-identified] : right chest wall

## 2024-02-06 NOTE — PHYSICAL EXAM
[Normal] : oriented to person, place and time, the affect was normal, the mood was normal and not anxious [de-identified] : erythema to right breast

## 2024-02-06 NOTE — REVIEW OF SYSTEMS
[Nausea: Grade 0] : Nausea: Grade 0 [Fatigue: Grade 1 - Fatigue relieved by rest] : Fatigue: Grade 1 - Fatigue relieved by rest [Breast Pain: Grade 1 - Mild pain] : Breast Pain: Grade 1 - Mild pain [Anxiety: Grade 0] : Anxiety: Grade 0  [Depression: Grade 0] : Depression: Grade 0 [Insomnia: Grade 0] : Insomnia: Grade 0 [Alopecia: Grade 0] : Alopecia: Grade 0 [Pruritus: Grade 0] : Pruritus: Grade 0 [Skin Atrophy: Grade 0] : Skin Atrophy: Grade 0 [Dermatitis Radiation: Grade 1 - Faint erythema or dry desquamation] : Dermatitis Radiation: Grade 1 - Faint erythema or dry desquamation

## 2024-02-06 NOTE — HISTORY OF PRESENT ILLNESS
[FreeTextEntry1] : This is a 73 y.o female withpT2(5cm)N1a(+1 +RADHA/7)Mx  UOQ, Right breast poorly differentiated IDC s/p R modified radical mastectomy, R SLNB, ALND, and right breast reconstruction utilizing tissue expander on 10/6/23 ER >90% NJ >90% Her-2 negative Ki-67 32%, all margins negative.  OTV APPTS: 2/6/24 - OTV - Patient has completed 5000 cGy / 5000 cGy to the right chest wall.  Patient states she is experiencing recurrence of pain surrounding expander in right breast, she continues to use gabapentin. She also endorses mild erythema to skin of right breast, she has mometasone. Patient has completed radiation today and will return for PTE 4/2/24.    1/30/24 - OTV - Patient has completed 4000 cGy / 5000 cGy to the right chest wall.  Patient finds pain relief of RIGHT breast expander with gabapentin rating it a 3/10.  Otherwise, she denies chest pain, sob, discoloration, dimpling, or nodules. Patient to continue mometasone and Aquaphor 2x a day.  Patient to continue radiation.  1/23/24 - OTV - Patient has completed 3000 cGy / 5000 cGy to the right chest wall.  Patient finds pain relief of RIGHT breast expander with gabapentin rating it a 2/10.  Otherwise, she denies chest pain, sob, discoloration, dimpling, or nodules. Patient to continue mometasone and Aquaphor 2x a day.  Patient to continue radiation.  1/16/24 - OTV - Patient has completed 2000/5000 cGy of radiation to the right chest wall. Today, patient endorses stable pain to right breast expander, patient has appointment with surgeon this thursday. Patient denies skin changes. She endorses mild fatigue relieved by naps. Patient to continue radiation.   1/9/24 - OTV - Ms. Marquez has completed 1200 cGy / 5000 cGy to the chest wall.  Today, patient endorses some pain with the expander on the right breast, patient is seeing the surgeon next week and plans to discuss. Denies skin irritation, s/s of esophagitis. endorses mild fatigue. patient to continue radiation.  ________________________________ ONCOLOGIC HISTORY Ms. 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 Duran and Dr. Purdy. Annual screening mammo on 7/6/23 showed a new mass with spiculated margins in the UOQ of the right breast, 2-3 cm from the nipple.  On right breast targeted mammo 8/14/23 there was a 1.9 x 1.6 x 2.4 cm lobulated mass at RIGHT breast 10:00 1cmfn and a  0.6 x 0.5 x 0.7  cm hypoechoic nodule with indistinct margins in the retroareolar RIGHT 6:00 position. 8/31/23 biopsy Site 1 - RIGHT BREAST 10:00 1 CM FN (ribbon clip) patho resulted as Poorly differentiated invasive ductal carcinoma  measuring 0.8 cm in maximal length in this material, focally involving a fibroadenoma. ER >90% NJ >90% Her-2 negative Ki-67 32% Site 2 - RIGHT BREAST 6:00, RETROAREOLAR (heart clip), patho resulted as moderately differentiated invasive ductal carcinoma  measuring 1.0 cm in maximal length in this material. ER >90% NJ >90% Her-2 negative Ki-67 14%.  On 10/6/23 patient underwent a R modified radical mastectomy, R SLNB, ALND, and right breast reconstruction utilizing tissue expander with Dr. Duran and Dr. Purdy.  Final path resulted as  5cm RIGHT IDC poorly differentiated; DCIS with intermediate to high nuclear grade, cribriform, solid and necrosis; all margins greater than 5mm positive for extensive lymphovascular invasion; perineural invasion identified; fibroadenoma involved by invasive and in situ carcinoma; prior procedure site changes ; margins negative; All margins negative for invasive carcinoma and Distance from Invasive Carcinoma to Closest Margin is Greater than 3 mm, All margins negative for DCIS and Distance from DCIS to Closest Margin is Greater than 5 mm; (1/7) lymph nodes positive for tumor (metastatic deposit measures at least 4mm), extranodal extension is present.  11/3/23 Oncotype: 14 Patient undergoing TE fills with Dr. Purdy.  11/13/23 NEW: Today the patient presents with her  and sister.  She reports feeling well overall.  She notes some discomfort in her right axilla postoperatively rated 1-3, taking OTC medications.  She also notes mild constipation recently.  She denies other complaints to include fevers, chills, dyspnea, chest pain, palpitations, nausea, vomiting, diarrhea.  Of note, she lives on the [facility].  She is an artist.  She denies h/o smoking and drug use.  Occasional alcohol.   __________________________________________________________________________________________ 7/6/23 (R) B/L sMMG: heterogeneously dense. -There is a new mass with spiculated margins in the UOQ of the right breast, 2-3 cm from the nipple. -There are stable bilateral circumscribed masses are noted in each breast, supporting a benign nature. FOLLOW-UP: Diagnostic mammography and targeted breast ultrasound BI-RADS 0: Incomplete.  8/14/23 (R) R DX MMG & Targeted R US: heterogeneously dense. 1. 1.9 x 1.6 x 2.4 cm lobulated suspicious mass in the 10:00 position of the right breast 1cmfn that corresponds to the persistent finding demonstrated mammographically. 2. Indeterminate a 0.6 x 0.5 x 0.7 cm hypoechoic nodule with indistinct margins in the retroareolar right breast, 6:00 position 3. 0.5 x 0.5 x 0.5cm circumcised hypoechoic nodule in the 3:00 position 1cmfn of the right breast   Additional nodules, see full report FOLLOW-UP: Ultrasound guided biopsy x2. BI-RADS 4: Suspicious.  8/31/23 ([facility] Path) US-guided biopsy x 2 Site 1 - RIGHT BREAST 10:00 1 CM FN (ribbon clip): Poorly differentiated invasive ductal carcinoma (tubule formation 3/3, nuclear pleomorphism 3/3, mitotic counts 2/3), measuring 0.8 cm in maximal length in this material, focally involving a fibroadenoma. ER >90% NJ >90% Her-2 negative Ki-67 32% Site 2 - RIGHT BREAST 6:00, RETROAREOLAR (heart clip): Moderately differentiated invasive ductal carcinoma (tubule formation 3/3, nuclear pleomorphism 2/3, mitotic counts 1/3), measuring 1.0 cm in maximal length in this material. ER >90% NJ >90% Her-2 negative Ki-67 14% Malignant, concordant. Breast surgical consultation for definitive management is recommended.  9/15/23 patho 1. Right Breast, 10:00, 1cm FN: -   Invasive poorly differentiated ductal carcinoma, 8 mm in greatest  dimension, adjacent to fibroadenoma. -   Focal ductal carcinoma in situ, high-grade, solid type. As per outside report the invasive carcinoma is positive for ER (3+90%, and NJ (3+ 90%).  HER2 is negative (1+).  Ki-67 is 32%.  2. Right Breast, 6:00, retroareolar: -   Invasive moderately differentiated ductal carcinoma, 10 mm in greatest dimension. As per outside report the invasive carcinoma is positive for ER (3+90%, and NJ (3+ 90%).  HER2 is negative (1+).  Ki-67 is 14%.  10/5/2023 Lymphoscintigraphy - Breast Right Impression: Successful identification of a right axillary sentinel lymph node.  10/6/23 Surgical patho Right breast mastectomy w/ SLNBx and right breast reconstruction with placement of tissue expander By Dr. Duran and Dr. Purdy Specimen(s) Submitted 1. Right sentinel node #1 [id number] 2. Right mastectomy 3. Anterior superior suture marks, new anterior margin 4. Right axillary contents 5. Right medial margin suture marks new anterior margin  Final Diagnosis  1.  [place] lymph node #1: - One lymph node positive for tumor (1/1). - Metastatic deposit measures at least 4 mm. - Extranodal extension is present.  2.  Breast, right, mastectomy: - Invasive ductal carcinoma, poorly differentiated, measuring approximately 5 cm. 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 - Margins, as present on this specimen: Invasive carcinoma: Anterior (LIQ): 3 mm Posterior: > 5 mm DCIS: All margins > 5 mm - Positive for lymphovascular invasion. - Perineural invasion is identified. -  Fibroadenoma involved by invasive and in situ carcinoma. - Prior procedure site changes.  3.  Breast, anterior superior margin: - Benign fibroadipose tissue and skeletal muscle.  4.  Axillary contents, right: -  Six lymph node negative for tumor (0/6).  5.  Breast, right, medial margin: - Benign breast tissue.  Synoptic Summary 2: Breast Invasive Carcinoma - Resection Specimen Procedure:  Total mastectomy Specimen Laterality:   Right Tumor Tumor Site:  Upper outer quadrant;  Lower outer quadrant Invasive carcinoma of no special type (ductal) Histologic Type: Histologic Grade ([place] Histologic Score): Glandular (Acinar) / Tubular Differentiation:    Score 3 Nuclear Pleomorphism:   Score 2 Mitotic Rate:   Score 3 Overall Grade:   Grade 3 (scores of 8 or 9) Tumor Size:  50 Millimeters (mm) Tumor Focality:   Single focus of invasive carcinoma Ductal Carcinoma In Situ (DCIS):    Present Architectural Patterns:   Cribriform;  Solid Nuclear Grade:   Grade III (high) Necrosis:  Present, central (expansive "comedo" necrosis) Lobular Carcinoma In Situ (LCIS):    Not identified Tumor Extent Tumor Extent:   Skin is present and involved Skin Invasion:  Carcinoma directly invades into the dermis or epidermis without skin ulceration (this does not change the T classification) Skin Satellite Foci:   Satellite foci not identified Lymphatic and / or Vascular Invasion:    Present - Extensive Dermal Lymphatic and / or Vascular Invasion:    Not identified Treatment Effect in the Breast:   No known presurgical therapy Margins Margin Status for Invasive Carcinoma:    All margins negative for invasive carcinoma Distance from Invasive Carcinoma to Closest Margin:    Greater than 3 mm Margin Status for DCIS:   All margins negative for DCIS Distance from DCIS to Closest Margin:    Greater than 5 mm Regional Lymph Nodes Regional Lymph Node Status:   Tumor present in regional lymph node(s) Number of Lymph Nodes with Macrometastases:    1 Number of Lymph Nodes with Micrometastases:    0 Size of Largest [place] Metastatic Deposit:    4 mm Extranodal Extension:   Present, 2 mm or less Total Number of Lymph Nodes Examined (sentinel and non-sentinel): 7 Number of [place] Nodes Examined:    1 pTNM Classification (AJCC 8th Edition) pT Category:   pT2 pN Category:   pN1a

## 2024-02-08 ENCOUNTER — APPOINTMENT (OUTPATIENT)
Dept: HEMATOLOGY ONCOLOGY | Facility: CLINIC | Age: 74
End: 2024-02-08
Payer: MEDICARE

## 2024-02-08 VITALS
RESPIRATION RATE: 18 BRPM | BODY MASS INDEX: 23.99 KG/M2 | DIASTOLIC BLOOD PRESSURE: 75 MMHG | TEMPERATURE: 98 F | WEIGHT: 119 LBS | SYSTOLIC BLOOD PRESSURE: 135 MMHG | OXYGEN SATURATION: 99 % | HEIGHT: 59 IN | HEART RATE: 67 BPM

## 2024-02-08 PROCEDURE — G2211 COMPLEX E/M VISIT ADD ON: CPT

## 2024-02-08 PROCEDURE — 99214 OFFICE O/P EST MOD 30 MIN: CPT

## 2024-02-08 NOTE — HISTORY OF PRESENT ILLNESS
[Disease: _____________________] : Disease: [unfilled] [T: ___] : T[unfilled] [N: ___] : N[unfilled] [M: ___] : M[unfilled] [AJCC Stage: ____] : AJCC Stage: [unfilled] [de-identified] : 73 year old female with right breast cancer s/p 10/6/23 R modified radical mastectomy, R SLNB, ALND, TE reconstruction, adjuvant RT (completed 2/6/2024) is here today for f/u.   She has a history of HTN, HLD, Afib-induced stroke in June, affecting her speech and cognitive functioning.  She takes Eliquis.  First palpated by patient within the past year, then seen on screening mammogram.   1/6/22 DEXA: osteopenia of the lumbar spine (T score -1.1)  7/6/23 (R) B/L sMMG: heterogeneously dense. -There is a new mass with spiculated margins in the UOQ of the right breast, 2-3 cm from the nipple. -There are stable bilateral circumscribed masses are noted in each breast, supporting a benign nature. FOLLOW-UP: Diagnostic mammography and targeted breast ultrasound BI-RADS 0: Incomplete.  8/14/23 (R) R DX MMG & Targeted R US: heterogeneously dense. 1. 1.9 x 1.6 x 2.4 cm lobulated suspicious mass in the 10:00 position of the right breast 1cmfn that corresponds to the persistent finding demonstrated mammographically. 2. Indeterminate a 0.6 x 0.5 x 0.7 cm hypoechoic nodule with indistinct margins in the retroareolar right breast, 6:00 position -Additional nodules, see full report FOLLOW-UP: Ultrasound guided biopsy x2. BI-RADS 4: Suspicious.  8/31/23 ([facility]/C[facility] Path) US-guided biopsy x 2 Site 1 - RIGHT BREAST 10:00 1 CM FN (ribbon clip): Poorly differentiated invasive ductal carcinoma (tubule formation 3/3, nuclear pleomorphism 3/3, mitotic counts 2/3), measuring 0.8 cm in maximal length in this material, focally involving a fibroadenoma. ER >90% DC >90% Her-2 negative Ki-67 32% Site 2 - RIGHT BREAST 6:00, RETROAREOLAR (heart clip): Moderately differentiated invasive ductal carcinoma (tubule formation 3/3, nuclear pleomorphism 2/3, mitotic counts 1/3), measuring 1.0 cm in maximal length in this material. ER >90% DC >90% Her-2 negative Ki-67 14% Malignant, concordant.   10/6/23 ([facility] Path): RIGHT MASTECTOMY: Invasive ductal carcinoma, poorly differentiated, measuring approximately 5 cm. 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 Margins, as present on this specimen: Invasive carcinoma: Anterior (LIQ): 3 mm. Posterior: > 5 mm. DCIS: All margins > 5 mm. Positive for lymphovascular invasion. Perineural invasion is identified. Fibroadenoma involved by invasive and in situ carcinoma. Prior procedure site changes. RIGHT ANTERIOR SUPERIOR MARGIN: Benign fibroadipose tissue and skeletal muscle. RIGHT BREAST MEDIAL MARGIN: Benign breast tissue. RIGHT AXILLARY CONTENTS: Six lymph node negative for tumor (0/6). RIGHT SENTINEL LYMPH NODE #1: One lymph node positive for tumor (1/1). Metastatic deposit measures at least 4 mm. Extranodal extension is present.  11/28/23 XR BONE DENSITY AXIAL: Osteopenia. spine T-Score  -1.4 [de-identified] : poorly differentiated invasive ductal carcinoma [de-identified] : ER+, [place]+, HER2-, Oncotype RS 14 [de-identified] : She completed RT 2/6/24, with pain, erythema, and fatigue.  The TE is rubbing and causing pain, for which she is taking gabapentin.  She will be on Eliquis indefinitely.  Patient bought an apartment in [place] to be closer to her family, considering moving and establishing care there.

## 2024-02-08 NOTE — PHYSICAL EXAM
[Normal] : affect appropriate [de-identified] : s/p right mastectomy, with tissue expander in place; surgical scars healing well

## 2024-02-08 NOTE — REVIEW OF SYSTEMS
[Fatigue] : fatigue [Diarrhea: Grade 0] : Diarrhea: Grade 0 [Negative] : Allergic/Immunologic [de-identified] : RT related erythema, right breast pain

## 2024-02-14 ENCOUNTER — NON-APPOINTMENT (OUTPATIENT)
Age: 74
End: 2024-02-14

## 2024-02-14 ENCOUNTER — APPOINTMENT (OUTPATIENT)
Dept: BREAST CENTER | Facility: CLINIC | Age: 74
End: 2024-02-14

## 2024-02-21 ENCOUNTER — NON-APPOINTMENT (OUTPATIENT)
Age: 74
End: 2024-02-21

## 2024-02-22 ENCOUNTER — APPOINTMENT (OUTPATIENT)
Dept: PLASTIC SURGERY | Facility: CLINIC | Age: 74
End: 2024-02-22
Payer: MEDICARE

## 2024-02-22 PROCEDURE — 99212 OFFICE O/P EST SF 10 MIN: CPT

## 2024-02-22 NOTE — HISTORY OF PRESENT ILLNESS
[FreeTextEntry1] : 74 y/o female presents 4 months s/p right breast reconstruction utilizing tissue expander on 10/06/2023. Denies any f/c/n/v. Patient is taking gabapentin and Advil prn for pain, reports improvement in pain overall. Patient finished radiation on 02/06/2024. Patient is applying Aquaphor on her breast.  PE: right TE in place, incision well healed, no collections or s/sx of infection.  Stigmata of recent RT to right chest   A/P Patient is currently on gabapentin TID. We discussed attempting to wean to BID now that RT is over.  Sports bra ROM exercises RTC 3 months to monitor healing from RT  Tentative plan for TE to implant replacement in August to permit trip to [place] in December.

## 2024-03-04 ENCOUNTER — RX RENEWAL (OUTPATIENT)
Age: 74
End: 2024-03-04

## 2024-03-07 ENCOUNTER — LABORATORY RESULT (OUTPATIENT)
Age: 74
End: 2024-03-07

## 2024-03-07 ENCOUNTER — APPOINTMENT (OUTPATIENT)
Dept: HEMATOLOGY ONCOLOGY | Facility: CLINIC | Age: 74
End: 2024-03-07
Payer: MEDICARE

## 2024-03-07 VITALS
TEMPERATURE: 97.6 F | WEIGHT: 120 LBS | DIASTOLIC BLOOD PRESSURE: 72 MMHG | HEIGHT: 59 IN | SYSTOLIC BLOOD PRESSURE: 129 MMHG | HEART RATE: 57 BPM | OXYGEN SATURATION: 98 % | BODY MASS INDEX: 24.19 KG/M2 | RESPIRATION RATE: 18 BRPM

## 2024-03-07 PROCEDURE — G2211 COMPLEX E/M VISIT ADD ON: CPT

## 2024-03-07 PROCEDURE — 99214 OFFICE O/P EST MOD 30 MIN: CPT

## 2024-03-07 NOTE — HISTORY OF PRESENT ILLNESS
[Disease: _____________________] : Disease: [unfilled] [T: ___] : T[unfilled] [N: ___] : N[unfilled] [M: ___] : M[unfilled] [AJCC Stage: ____] : AJCC Stage: [unfilled] [de-identified] : 73 year old female with right breast cancer s/p 10/6/23 R modified radical mastectomy, R SLNB, ALND, TE reconstruction, adjuvant RT (completed 2/6/2024) is here today for f/u.   She has a history of HTN, HLD, Afib-induced stroke in June, affecting her speech and cognitive functioning.  She takes Eliquis.  First palpated by patient within the past year, then seen on screening mammogram.   1/6/22 DEXA: osteopenia of the lumbar spine (T score -1.1)  7/6/23 (R) B/L sMMG: heterogeneously dense. -There is a new mass with spiculated margins in the UOQ of the right breast, 2-3 cm from the nipple. -There are stable bilateral circumscribed masses are noted in each breast, supporting a benign nature. FOLLOW-UP: Diagnostic mammography and targeted breast ultrasound BI-RADS 0: Incomplete.  8/14/23 (R) R DX MMG & Targeted R US: heterogeneously dense. 1. 1.9 x 1.6 x 2.4 cm lobulated suspicious mass in the 10:00 position of the right breast 1cmfn that corresponds to the persistent finding demonstrated mammographically. 2. Indeterminate a 0.6 x 0.5 x 0.7 cm hypoechoic nodule with indistinct margins in the retroareolar right breast, 6:00 position -Additional nodules, see full report FOLLOW-UP: Ultrasound guided biopsy x2. BI-RADS 4: Suspicious.  8/31/23 ([facility]/C[facility] Path) US-guided biopsy x 2 Site 1 - RIGHT BREAST 10:00 1 CM FN (ribbon clip): Poorly differentiated invasive ductal carcinoma (tubule formation 3/3, nuclear pleomorphism 3/3, mitotic counts 2/3), measuring 0.8 cm in maximal length in this material, focally involving a fibroadenoma. ER >90% [place] >90% Her-2 negative Ki-67 32% Site 2 - RIGHT BREAST 6:00, RETROAREOLAR (heart clip): Moderately differentiated invasive ductal carcinoma (tubule formation 3/3, nuclear pleomorphism 2/3, mitotic counts 1/3), measuring 1.0 cm in maximal length in this material. ER >90% [place] >90% Her-2 negative Ki-67 14% Malignant, concordant.   10/6/23 ([facility] Path): RIGHT MASTECTOMY: Invasive ductal carcinoma, poorly differentiated, measuring approximately 5 cm. 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 Margins, as present on this specimen: Invasive carcinoma: Anterior (LIQ): 3 mm. Posterior: > 5 mm. DCIS: All margins > 5 mm. Positive for lymphovascular invasion. Perineural invasion is identified. Fibroadenoma involved by invasive and in situ carcinoma. Prior procedure site changes. RIGHT ANTERIOR SUPERIOR MARGIN: Benign fibroadipose tissue and skeletal muscle. RIGHT BREAST MEDIAL MARGIN: Benign breast tissue. RIGHT AXILLARY CONTENTS: Six lymph node negative for tumor (0/6). RIGHT SENTINEL LYMPH NODE #1: One lymph node positive for tumor (1/1). Metastatic deposit measures at least 4 mm. Extranodal extension is present.  11/28/23 XR BONE DENSITY AXIAL: Osteopenia. spine T-Score  -1.4 [de-identified] : poorly differentiated invasive ductal carcinoma [de-identified] : ER+, [place]+, HER2-, Oncotype RS 14 [de-identified] : She completed RT 2/6/24, RT-skin changes improving.  The TE is rubbing and causing pain, for which she is taking gabapentin.  Planning implant exchange in August. Continues to use Aquaphor.

## 2024-03-07 NOTE — REVIEW OF SYSTEMS
[Fatigue] : fatigue [Diarrhea: Grade 0] : Diarrhea: Grade 0 [Negative] : Allergic/Immunologic [de-identified] : RT related erythema, right breast pain

## 2024-03-18 RX ORDER — GABAPENTIN 300 MG/1
300 CAPSULE ORAL
Qty: 90 | Refills: 0 | Status: ACTIVE | COMMUNITY
Start: 2024-03-18 | End: 1900-01-01

## 2024-03-28 ENCOUNTER — APPOINTMENT (OUTPATIENT)
Dept: HEMATOLOGY ONCOLOGY | Facility: CLINIC | Age: 74
End: 2024-03-28
Payer: MEDICARE

## 2024-03-28 VITALS
SYSTOLIC BLOOD PRESSURE: 118 MMHG | HEIGHT: 59 IN | OXYGEN SATURATION: 99 % | WEIGHT: 117 LBS | TEMPERATURE: 98.8 F | RESPIRATION RATE: 18 BRPM | HEART RATE: 63 BPM | BODY MASS INDEX: 23.59 KG/M2 | DIASTOLIC BLOOD PRESSURE: 73 MMHG

## 2024-03-28 PROCEDURE — G2211 COMPLEX E/M VISIT ADD ON: CPT

## 2024-03-28 PROCEDURE — 99214 OFFICE O/P EST MOD 30 MIN: CPT

## 2024-03-28 NOTE — REVIEW OF SYSTEMS
[Fatigue] : fatigue [Abdominal Pain] : abdominal pain [Diarrhea: Grade 1 - Increase of <4 stools per day over baseline; mild increase in ostomy output compared to baseline] : Diarrhea: Grade 1 - Increase of <4 stools per day over baseline; mild increase in ostomy output compared to baseline [Negative] : Heme/Lymph [FreeTextEntry9] : neck tightness [de-identified] : right breast RT changes, using aquaphor

## 2024-03-28 NOTE — HISTORY OF PRESENT ILLNESS
[Disease: _____________________] : Disease: [unfilled] [T: ___] : T[unfilled] [N: ___] : N[unfilled] [M: ___] : M[unfilled] [AJCC Stage: ____] : AJCC Stage: [unfilled] [de-identified] : 73 year old female with right breast cancer s/p 10/6/23 R modified radical mastectomy, R SLNB, ALND, TE reconstruction, adjuvant RT (completed 2/6/2024) is here today for f/u.   She has a history of HTN, HLD, Afib-induced stroke in June, affecting her speech and cognitive functioning.  She takes Eliquis.  First palpated by patient within the past year, then seen on screening mammogram.   1/6/22 DEXA: osteopenia of the lumbar spine (T score -1.1)  7/6/23 (R) B/L sMMG: heterogeneously dense. -There is a new mass with spiculated margins in the UOQ of the right breast, 2-3 cm from the nipple. -There are stable bilateral circumscribed masses are noted in each breast, supporting a benign nature. FOLLOW-UP: Diagnostic mammography and targeted breast ultrasound BI-RADS 0: Incomplete.  8/14/23 (R) R DX MMG & Targeted R US: heterogeneously dense. 1. 1.9 x 1.6 x 2.4 cm lobulated suspicious mass in the 10:00 position of the right breast 1cmfn that corresponds to the persistent finding demonstrated mammographically. 2. Indeterminate a 0.6 x 0.5 x 0.7 cm hypoechoic nodule with indistinct margins in the retroareolar right breast, 6:00 position -Additional nodules, see full report FOLLOW-UP: Ultrasound guided biopsy x2. BI-RADS 4: Suspicious.  8/31/23 ([facility]/CBL Path) US-guided biopsy x 2 Site 1 - RIGHT BREAST 10:00 1 CM FN (ribbon clip): Poorly differentiated invasive ductal carcinoma (tubule formation 3/3, nuclear pleomorphism 3/3, mitotic counts 2/3), measuring 0.8 cm in maximal length in this material, focally involving a fibroadenoma. ER >90% [place] >90% Her-2 negative Ki-67 32% Site 2 - RIGHT BREAST 6:00, RETROAREOLAR (heart clip): Moderately differentiated invasive ductal carcinoma (tubule formation 3/3, nuclear pleomorphism 2/3, mitotic counts 1/3), measuring 1.0 cm in maximal length in this material. ER >90% [place] >90% Her-2 negative Ki-67 14% Malignant, concordant.   10/6/23 ([facility] Path): RIGHT MASTECTOMY: Invasive ductal carcinoma, poorly differentiated, measuring approximately 5 cm. 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 Margins, as present on this specimen: Invasive carcinoma: Anterior (LIQ): 3 mm. Posterior: > 5 mm. DCIS: All margins > 5 mm. Positive for lymphovascular invasion. Perineural invasion is identified. Fibroadenoma involved by invasive and in situ carcinoma. Prior procedure site changes. RIGHT ANTERIOR SUPERIOR MARGIN: Benign fibroadipose tissue and skeletal muscle. RIGHT BREAST MEDIAL MARGIN: Benign breast tissue. RIGHT AXILLARY CONTENTS: Six lymph node negative for tumor (0/6). RIGHT SENTINEL LYMPH NODE #1: One lymph node positive for tumor (1/1). Metastatic deposit measures at least 4 mm. Extranodal extension is present.  11/28/23 XR BONE DENSITY AXIAL: Osteopenia. spine T-Score  -1.4 [de-identified] : poorly differentiated invasive ductal carcinoma [de-identified] : She started Verzenio and anastrozole two weeks ago.  Reports grumpiness, low blood pressure (90/60s), abdominal pain, diarrhea, fatigue.  She takes imodium with some relief.  Vomiting x 1 episodes.  She does not like gatorade and water with electrolytes; did not agree with her.  Panic attacks with going up stairs.  Mostly stays in bed. 3/26/24 CBC/CMP WNL. [de-identified] : ER+, [place]+, HER2-, Oncotype RS 14

## 2024-03-28 NOTE — ASSESSMENT
[FreeTextEntry1] : Offered rehabilitation referral, family does not think she will be able to go through with it.  Advised home exercise program.

## 2024-03-28 NOTE — REASON FOR VISIT
[Follow-Up Visit] : a follow-up [Spouse] : spouse [Other: _____] : [unfilled] [FreeTextEntry2] : breast cancer

## 2024-04-02 ENCOUNTER — APPOINTMENT (OUTPATIENT)
Dept: RADIATION ONCOLOGY | Facility: CLINIC | Age: 74
End: 2024-04-02
Payer: MEDICARE

## 2024-04-02 VITALS
TEMPERATURE: 98.7 F | HEIGHT: 62 IN | RESPIRATION RATE: 18 BRPM | OXYGEN SATURATION: 100 % | WEIGHT: 118.04 LBS | BODY MASS INDEX: 21.72 KG/M2 | SYSTOLIC BLOOD PRESSURE: 105 MMHG | HEART RATE: 106 BPM | DIASTOLIC BLOOD PRESSURE: 65 MMHG

## 2024-04-02 PROCEDURE — 99024 POSTOP FOLLOW-UP VISIT: CPT

## 2024-04-02 RX ORDER — MOMETASONE FUROATE 1 MG/G
0.1 OINTMENT TOPICAL TWICE DAILY
Qty: 1 | Refills: 4 | Status: COMPLETED | COMMUNITY
Start: 2023-11-14 | End: 2024-04-02

## 2024-04-02 RX ORDER — MOMETASONE FUROATE 1 MG/G
0.1 OINTMENT TOPICAL TWICE DAILY
Qty: 1 | Refills: 5 | Status: COMPLETED | COMMUNITY
Start: 2023-11-13 | End: 2024-04-02

## 2024-04-02 NOTE — PHYSICAL EXAM
[Normal] : normal scars, no masses, no nipple discharge [de-identified] : tanning to skin of right breast

## 2024-04-02 NOTE — HISTORY OF PRESENT ILLNESS
[FreeTextEntry1] : Ms. Bernadine Marquez is a 73 year-old female who completed radiation therapy 50 Gy to the RIGHT CW, axilla, and SCV 1/2/24-2/6/24 for a [id number] Right breast UOQ poorly differentiated IDC s/p R modified radical mastectomy, R SLNB, ALND, and right breast reconstruction utilizing tissue expander on 10/6/23 ER >90% [place] >90% Her-2 negative.    4/2/24 - PTE Ms. Marquez presents for post-treatment evaluation with her  and daughter s/p completion of 50Gy radiation to RIGHT CW, axilla and SCV 2/6/24. She is on anastrozole and abemaciclib under the care of Dr. Munoz, last visit was on 3/28/24.  She will follow-up with breast surgery on 5/24/24.  She endorses tanning of skin to radiated area and slightly improved nerve pain to right chest wall. She continues to use aquaphor. Per patient, she is having expanders removed 8/2024.   _______________________________ ONCOLOGIC HISTORY Ms. 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 Duran and Dr. Purdy. Annual screening mammo on 7/6/23 showed a new mass with spiculated margins in the UOQ of the right breast, 2-3 cm from the nipple.  On right breast targeted mammo 8/14/23 there was a 1.9 x 1.6 x 2.4 cm lobulated mass at RIGHT breast 10:00 1cmfn and a  0.6 x 0.5 x 0.7  cm hypoechoic nodule with indistinct margins in the retroareolar RIGHT 6:00 position. 8/31/23 biopsy Site 1 - RIGHT BREAST 10:00 1 CM FN (ribbon clip) patho resulted as Poorly differentiated invasive ductal carcinoma  measuring 0.8 cm in maximal length in this material, focally involving a fibroadenoma. ER >90% [place] >90% Her-2 negative Ki-67 32% Site 2 - RIGHT BREAST 6:00, RETROAREOLAR (heart clip), patho resulted as moderately differentiated invasive ductal carcinoma  measuring 1.0 cm in maximal length in this material. ER >90% [place] >90% Her-2 negative Ki-67 14%.  On 10/6/23 patient underwent a R modified radical mastectomy, R SLNB, ALND, and right breast reconstruction utilizing tissue expander with Dr. Duran and Dr. Purdy.  Final path resulted as  5cm RIGHT IDC poorly differentiated; DCIS with intermediate to high nuclear grade, cribriform, solid and necrosis; all margins greater than 5mm positive for extensive lymphovascular invasion; perineural invasion identified; fibroadenoma involved by invasive and in situ carcinoma; prior procedure site changes ; margins negative; All margins negative for invasive carcinoma and Distance from Invasive Carcinoma to Closest Margin is Greater than 3 mm, All margins negative for DCIS and Distance from DCIS to Closest Margin is Greater than 5 mm; (1/7) lymph nodes positive for tumor (metastatic deposit measures at least 4mm), extranodal extension is present.  11/3/23 Oncotype: 14 Patient undergoing TE fills with Dr. Purdy.  11/13/23 NEW: Today the patient presents with her  and sister.  She reports feeling well overall.  She notes some discomfort in her right axilla postoperatively rated 1-3, taking OTC medications.  She also notes mild constipation recently.  She denies other complaints to include fevers, chills, dyspnea, chest pain, palpitations, nausea, vomiting, diarrhea.  Of note, she lives on the [facility].  She is an artist.  She denies h/o smoking and drug use.  Occasional alcohol.   __________________________________________________________________________________________ 7/6/23 ([facility]) B/L sMMG: heterogeneously dense. -There is a new mass with spiculated margins in the UOQ of the right breast, 2-3 cm from the nipple. -There are stable bilateral circumscribed masses are noted in each breast, supporting a benign nature. FOLLOW-UP: Diagnostic mammography and targeted breast ultrasound BI-RADS 0: Incomplete.  8/14/23 ([facility]) R DX MMG & Targeted R US: heterogeneously dense. 1. 1.9 x 1.6 x 2.4 cm lobulated suspicious mass in the 10:00 position of the right breast 1cmfn that corresponds to the persistent finding demonstrated mammographically. 2. Indeterminate a 0.6 x 0.5 x 0.7 cm hypoechoic nodule with indistinct margins in the retroareolar right breast, 6:00 position 3. 0.5 x 0.5 x 0.5cm circumcised hypoechoic nodule in the 3:00 position 1cmfn of the right breast   Additional nodules, see full report FOLLOW-UP: Ultrasound guided biopsy x2. BI-RADS 4: Suspicious.  8/31/23 ([facility] Path) US-guided biopsy x 2 Site 1 - RIGHT BREAST 10:00 1 CM FN (ribbon clip): Poorly differentiated invasive ductal carcinoma (tubule formation 3/3, nuclear pleomorphism 3/3, mitotic counts 2/3), measuring 0.8 cm in maximal length in this material, focally involving a fibroadenoma. ER >90% [place] >90% Her-2 negative Ki-67 32% Site 2 - RIGHT BREAST 6:00, RETROAREOLAR (heart clip): Moderately differentiated invasive ductal carcinoma (tubule formation 3/3, nuclear pleomorphism 2/3, mitotic counts 1/3), measuring 1.0 cm in maximal length in this material. ER >90% [place] >90% Her-2 negative Ki-67 14% Malignant, concordant. Breast surgical consultation for definitive management is recommended.  9/15/23 patho 1. Right Breast, 10:00, 1cm FN: -   Invasive poorly differentiated ductal carcinoma, 8 mm in greatest  dimension, adjacent to fibroadenoma. -   Focal ductal carcinoma in situ, high-grade, solid type. As per outside report the invasive carcinoma is positive for ER (3+90%, and AK (3+ 90%).  HER2 is negative (1+).  Ki-67 is 32%.  2. Right Breast, 6:00, retroareolar: -   Invasive moderately differentiated ductal carcinoma, 10 mm in greatest dimension. As per outside report the invasive carcinoma is positive for ER (3+90%, and AK (3+ 90%).  HER2 is negative (1+).  Ki-67 is 14%.  10/5/2023 Lymphoscintigraphy - Breast Right Impression: Successful identification of a right axillary sentinel lymph node.  10/6/23 Surgical patho Right breast mastectomy w/ SLNBx and right breast reconstruction with placement of tissue expander By Dr. Duran and Dr. Purdy Specimen(s) Submitted 1. Right sentinel node #1 [id number] 2. Right mastectomy 3. Anterior superior suture marks, new anterior margin 4. Right axillary contents 5. Right medial margin suture marks new anterior margin  Final Diagnosis  1.  [place] lymph node #1: - One lymph node positive for tumor (1/1). - Metastatic deposit measures at least 4 mm. - Extranodal extension is present.  2.  Breast, right, mastectomy: - Invasive ductal carcinoma, poorly differentiated, measuring approximately 5 cm. 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 - Margins, as present on this specimen: Invasive carcinoma: Anterior (LIQ): 3 mm Posterior: > 5 mm DCIS: All margins > 5 mm - Positive for lymphovascular invasion. - Perineural invasion is identified. -  Fibroadenoma involved by invasive and in situ carcinoma. - Prior procedure site changes.  3.  Breast, anterior superior margin: - Benign fibroadipose tissue and skeletal muscle.  4.  Axillary contents, right: -  Six lymph node negative for tumor (0/6).  5.  Breast, right, medial margin: - Benign breast tissue.  Synoptic Summary 2: Breast Invasive Carcinoma - Resection Specimen Procedure:  Total mastectomy Specimen Laterality:   Right Tumor Tumor Site:  Upper outer quadrant;  Lower outer quadrant Invasive carcinoma of no special type (ductal) Histologic Type: Histologic Grade (Berna Histologic Score): Glandular (Acinar) / Tubular Differentiation:    Score 3 Nuclear Pleomorphism:   Score 2 Mitotic Rate:   Score 3 Overall Grade:   Grade 3 (scores of 8 or 9) Tumor Size:  50 Millimeters (mm) Tumor Focality:   Single focus of invasive carcinoma Ductal Carcinoma In Situ (DCIS):    Present Architectural Patterns:   Cribriform;  Solid Nuclear Grade:   Grade III (high) Necrosis:  Present, central (expansive "comedo" necrosis) Lobular Carcinoma In Situ (LCIS):    Not identified Tumor Extent Tumor Extent:   Skin is present and involved Skin Invasion:  Carcinoma directly invades into the dermis or epidermis without skin ulceration (this does not change the T classification) Skin Satellite Foci:   Satellite foci not identified Lymphatic and / or Vascular Invasion:    Present - Extensive Dermal Lymphatic and / or Vascular Invasion:    Not identified Treatment Effect in the Breast:   No known presurgical therapy Margins Margin Status for Invasive Carcinoma:    All margins negative for invasive carcinoma Distance from Invasive Carcinoma to Closest Margin:    Greater than 3 mm Margin Status for DCIS:   All margins negative for DCIS Distance from DCIS to Closest Margin:    Greater than 5 mm Regional Lymph Nodes Regional Lymph Node Status:   Tumor present in regional lymph node(s) Number of Lymph Nodes with Macrometastases:    1 Number of Lymph Nodes with Micrometastases:    0 Size of Largest [place] Metastatic Deposit:    4 mm Extranodal Extension:   Present, 2 mm or less Total Number of Lymph Nodes Examined (sentinel and non-sentinel): 7 Number of [place] Nodes Examined:    1 pTNM Classification (AJCC 8th Edition) pT Category:   pT2 pN Category:   pN1a

## 2024-04-02 NOTE — REVIEW OF SYSTEMS
[Nausea: Grade 0] : Nausea: Grade 0 [Fatigue: Grade 1 - Fatigue relieved by rest] : Fatigue: Grade 1 - Fatigue relieved by rest [Breast Pain: Grade 1 - Mild pain] : Breast Pain: Grade 1 - Mild pain [Anxiety: Grade 0] : Anxiety: Grade 0  [Depression: Grade 0] : Depression: Grade 0 [Insomnia: Grade 0] : Insomnia: Grade 0 [Alopecia: Grade 0] : Alopecia: Grade 0 [Pruritus: Grade 0] : Pruritus: Grade 0 [Skin Atrophy: Grade 0] : Skin Atrophy: Grade 0 [Dermatitis Radiation: Grade 1 - Faint erythema or dry desquamation] : Dermatitis Radiation: Grade 1 - Faint erythema or dry desquamation [Negative] : Psychiatric [de-identified] : tanning of skin

## 2024-04-11 ENCOUNTER — APPOINTMENT (OUTPATIENT)
Dept: HEMATOLOGY ONCOLOGY | Facility: CLINIC | Age: 74
End: 2024-04-11
Payer: MEDICARE

## 2024-04-11 VITALS
DIASTOLIC BLOOD PRESSURE: 65 MMHG | HEIGHT: 62 IN | TEMPERATURE: 97.4 F | BODY MASS INDEX: 20.98 KG/M2 | OXYGEN SATURATION: 98 % | SYSTOLIC BLOOD PRESSURE: 113 MMHG | WEIGHT: 114 LBS | RESPIRATION RATE: 18 BRPM | HEART RATE: 61 BPM

## 2024-04-11 PROCEDURE — 99214 OFFICE O/P EST MOD 30 MIN: CPT

## 2024-04-11 PROCEDURE — G2211 COMPLEX E/M VISIT ADD ON: CPT

## 2024-04-11 NOTE — PHYSICAL EXAM
[de-identified] : shoulder abduction 160 degrees bilaterally, with pulling on the right side [Normal] : affect appropriate [de-identified] : s/p right mastectomy, with tissue expander in place; surgical scars healing well  [de-identified] : seborrheic keratosis in right breast area

## 2024-04-11 NOTE — ASSESSMENT
[FreeTextEntry1] : Advised to increase hydration, at least 1L of electrolyte water, increase protein intake. Verzenio - hold for 1 week, then take once a day.

## 2024-04-11 NOTE — REVIEW OF SYSTEMS
[Fatigue] : fatigue [Abdominal Pain] : abdominal pain [Diarrhea: Grade 1 - Increase of <4 stools per day over baseline; mild increase in ostomy output compared to baseline] : Diarrhea: Grade 1 - Increase of <4 stools per day over baseline; mild increase in ostomy output compared to baseline [Negative] : Allergic/Immunologic [FreeTextEntry9] : neck tightness [de-identified] : right breast RT changes, using aquaphor

## 2024-05-01 NOTE — HISTORY OF PRESENT ILLNESS
[de-identified] : 73 year old female with right breast cancer s/p 10/6/23 R modified radical mastectomy, R SLNB, ALND, TE reconstruction, adjuvant RT (completed 2/6/2024) is here today for f/u.  She has been on Verzenio and anastrozole since mid March 2024.  She has a history of HTN, HLD, Afib-induced stroke in June, affecting her speech and cognitive functioning.  She takes Eliquis.  First palpated by patient within the past year, then seen on screening mammogram.   1/6/22 DEXA: osteopenia of the lumbar spine (T score -1.1)  7/6/23 (R) B/L sMMG: heterogeneously dense. -There is a new mass with spiculated margins in the UOQ of the right breast, 2-3 cm from the nipple. -There are stable bilateral circumscribed masses are noted in each breast, supporting a benign nature. FOLLOW-UP: Diagnostic mammography and targeted breast ultrasound BI-RADS 0: Incomplete.  8/14/23 (R) R DX MMG & Targeted R US: heterogeneously dense. 1. 1.9 x 1.6 x 2.4 cm lobulated suspicious mass in the 10:00 position of the right breast 1cmfn that corresponds to the persistent finding demonstrated mammographically. 2. Indeterminate a 0.6 x 0.5 x 0.7 cm hypoechoic nodule with indistinct margins in the retroareolar right breast, 6:00 position -Additional nodules, see full report FOLLOW-UP: Ultrasound guided biopsy x2. BI-RADS 4: Suspicious.  8/31/23 ([facility]) US-guided biopsy x 2 Site 1 - RIGHT BREAST 10:00 1 CM FN (ribbon clip): Poorly differentiated invasive ductal carcinoma (tubule formation 3/3, nuclear pleomorphism 3/3, mitotic counts 2/3), measuring 0.8 cm in maximal length in this material, focally involving a fibroadenoma. ER >90% [place] >90% Her-2 negative Ki-67 32% Site 2 - RIGHT BREAST 6:00, RETROAREOLAR (heart clip): Moderately differentiated invasive ductal carcinoma (tubule formation 3/3, nuclear pleomorphism 2/3, mitotic counts 1/3), measuring 1.0 cm in maximal length in this material. ER >90% [place] >90% Her-2 negative Ki-67 14% Malignant, concordant.   10/6/23 ([facility] Surgical Path): RIGHT MASTECTOMY: Invasive ductal carcinoma, poorly differentiated, measuring approximately 5 cm. 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 Margins, as present on this specimen: Invasive carcinoma: Anterior (LIQ): 3 mm. Posterior: > 5 mm. DCIS: All margins > 5 mm. Positive for lymphovascular invasion. Perineural invasion is identified. Fibroadenoma involved by invasive and in situ carcinoma. Prior procedure site changes. RIGHT ANTERIOR SUPERIOR MARGIN: Benign fibroadipose tissue and skeletal muscle. RIGHT BREAST MEDIAL MARGIN: Benign breast tissue. RIGHT AXILLARY CONTENTS: Six lymph node negative for tumor (0/6). RIGHT SENTINEL LYMPH NODE #1: One lymph node positive for tumor (1/1). Metastatic deposit measures at least 4 mm. Extranodal extension is present.  11/28/23 XR BONE DENSITY AXIAL: Osteopenia. spine T-Score  -1.4 [de-identified] : poorly differentiated invasive ductal carcinoma [de-identified] : ER+, ND+, HER2-, Ki-67 32%, Oncotype RS 14 [de-identified] : She continues to report abdominal pain, diarrhea, fatigue.  She is taking Pedialyte.  She is eating mostly rice and crackers.  Reviewed 4/9/24 CBC/CMP.. Potassium 3.2, Creatinine 1.25, albumin 3.5.  [Disease: _____________________] : Disease: [unfilled] [T: ___] : T[unfilled] [N: ___] : N[unfilled] [M: ___] : M[unfilled] [AJCC Stage: ____] : AJCC Stage: [unfilled]

## 2024-05-01 NOTE — REASON FOR VISIT
[FreeTextEntry2] : breast cancer [Follow-Up Visit] : a follow-up [Spouse] : spouse [Other: _____] : [unfilled]

## 2024-05-02 ENCOUNTER — APPOINTMENT (OUTPATIENT)
Dept: HEMATOLOGY ONCOLOGY | Facility: CLINIC | Age: 74
End: 2024-05-02
Payer: MEDICARE

## 2024-05-02 VITALS
DIASTOLIC BLOOD PRESSURE: 65 MMHG | TEMPERATURE: 98.2 F | HEART RATE: 66 BPM | BODY MASS INDEX: 21.35 KG/M2 | WEIGHT: 116 LBS | RESPIRATION RATE: 18 BRPM | HEIGHT: 62 IN | SYSTOLIC BLOOD PRESSURE: 112 MMHG | OXYGEN SATURATION: 99 %

## 2024-05-02 PROCEDURE — 99214 OFFICE O/P EST MOD 30 MIN: CPT

## 2024-05-02 PROCEDURE — G2211 COMPLEX E/M VISIT ADD ON: CPT

## 2024-05-02 NOTE — PHYSICAL EXAM
[de-identified] : right shoulder abduction to 120 degrees, left shoulder abduction to 180 degrees.

## 2024-05-02 NOTE — HISTORY OF PRESENT ILLNESS
[de-identified] : 74 year old female with right breast cancer s/p 10/6/23 R modified radical mastectomy, R SLNB, ALND, TE reconstruction, adjuvant RT (completed 2/6/2024) is here today for f/u.  She has been on Verzenio and anastrozole since mid March 2024.  She has a history of HTN, HLD, Afib-induced stroke in June, affecting her speech and cognitive functioning.  She takes Eliquis.  First palpated by patient within the past year, then seen on screening mammogram.   1/6/22 DEXA: osteopenia of the lumbar spine (T score -1.1)  7/6/23 (R) B/L sMMG: heterogeneously dense. -There is a new mass with spiculated margins in the UOQ of the right breast, 2-3 cm from the nipple. -There are stable bilateral circumscribed masses are noted in each breast, supporting a benign nature. FOLLOW-UP: Diagnostic mammography and targeted breast ultrasound BI-RADS 0: Incomplete.  8/14/23 (R) R DX MMG & Targeted R US: heterogeneously dense. 1. 1.9 x 1.6 x 2.4 cm lobulated suspicious mass in the 10:00 position of the right breast 1cmfn that corresponds to the persistent finding demonstrated mammographically. 2. Indeterminate a 0.6 x 0.5 x 0.7 cm hypoechoic nodule with indistinct margins in the retroareolar right breast, 6:00 position -Additional nodules, see full report FOLLOW-UP: Ultrasound guided biopsy x2. BI-RADS 4: Suspicious.  8/31/23 ([facility]) US-guided biopsy x 2 Site 1 - RIGHT BREAST 10:00 1 CM FN (ribbon clip): Poorly differentiated invasive ductal carcinoma (tubule formation 3/3, nuclear pleomorphism 3/3, mitotic counts 2/3), measuring 0.8 cm in maximal length in this material, focally involving a fibroadenoma. ER >90% [place] >90% Her-2 negative Ki-67 32% Site 2 - RIGHT BREAST 6:00, RETROAREOLAR (heart clip): Moderately differentiated invasive ductal carcinoma (tubule formation 3/3, nuclear pleomorphism 2/3, mitotic counts 1/3), measuring 1.0 cm in maximal length in this material. ER >90% [place] >90% Her-2 negative Ki-67 14% Malignant, concordant.   10/6/23 ([facility] Surgical Path): RIGHT MASTECTOMY: Invasive ductal carcinoma, poorly differentiated, measuring approximately 5 cm. 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 Margins, as present on this specimen: Invasive carcinoma: Anterior (LIQ): 3 mm. Posterior: > 5 mm. DCIS: All margins > 5 mm. Positive for lymphovascular invasion. Perineural invasion is identified. Fibroadenoma involved by invasive and in situ carcinoma. Prior procedure site changes. RIGHT ANTERIOR SUPERIOR MARGIN: Benign fibroadipose tissue and skeletal muscle. RIGHT BREAST MEDIAL MARGIN: Benign breast tissue. RIGHT AXILLARY CONTENTS: Six lymph node negative for tumor (0/6). RIGHT SENTINEL LYMPH NODE #1: One lymph node positive for tumor (1/1). Metastatic deposit measures at least 4 mm. Extranodal extension is present.  11/28/23 XR BONE DENSITY AXIAL: Osteopenia. spine T-Score  -1.4 [de-identified] : ER+, MA+, HER2-, Ki-67 32%, Oncotype RS 14 [de-identified] : poorly differentiated invasive ductal carcinoma [de-identified] : At last visit she was advised to hold abema for 1 week, then resume at once daily dosing because she already filled a new rx.  She is tolerating once a day better. Creatinine has improved since last visit.  She has been hydrating (water mixed with Pedialyte) and eating more.  She has been going out more to walk outdoors.  Has minor GI upset, improved.  She has occasional twinges in the right breast.

## 2024-05-02 NOTE — ASSESSMENT
[With Patient/Caregiver] : With Patient/Caregiver [Designated Health Care Proxy] : Designated Health Care Proxy [Name: ___] : Name: [unfilled] [FreeTextEntry1] : Offered physical therapy for shoulder, patient will consider. [AdvancecareDate] : 5/2/24

## 2024-05-02 NOTE — BEGINNING OF VISIT
[1] : 2) Feeling down, depressed, or hopeless for several days (1) [PHQ-2 Negative] : PHQ-2 Negative [YCE5Opiae] : 2 [Date Discussed (MM/DD/YY): ___] :  Discussed: [unfilled] [With Patient/Caregiver] : with Patient/Caregiver

## 2024-05-02 NOTE — BEGINNING OF VISIT
[1] : 2) Feeling down, depressed, or hopeless for several days (1) [PHQ-2 Negative] : PHQ-2 Negative [[id number]] : 2 [Date Discussed (MM/DD/YY): ___] :  Discussed: [unfilled] [With Patient/Caregiver] : with Patient/Caregiver

## 2024-05-02 NOTE — PHYSICAL EXAM
[de-identified] : right shoulder abduction to 120 degrees, left shoulder abduction to 180 degrees.

## 2024-05-02 NOTE — HISTORY OF PRESENT ILLNESS
[de-identified] : 74 year old female with right breast cancer s/p 10/6/23 R modified radical mastectomy, R SLNB, ALND, TE reconstruction, adjuvant RT (completed 2/6/2024) is here today for f/u.  She has been on Verzenio and anastrozole since mid March 2024.  She has a history of HTN, HLD, Afib-induced stroke in June, affecting her speech and cognitive functioning.  She takes Eliquis.  First palpated by patient within the past year, then seen on screening mammogram.   1/6/22 DEXA: osteopenia of the lumbar spine (T score -1.1)  7/6/23 (R) B/L sMMG: heterogeneously dense. -There is a new mass with spiculated margins in the UOQ of the right breast, 2-3 cm from the nipple. -There are stable bilateral circumscribed masses are noted in each breast, supporting a benign nature. FOLLOW-UP: Diagnostic mammography and targeted breast ultrasound BI-RADS 0: Incomplete.  8/14/23 (R) R DX MMG & Targeted R US: heterogeneously dense. 1. 1.9 x 1.6 x 2.4 cm lobulated suspicious mass in the 10:00 position of the right breast 1cmfn that corresponds to the persistent finding demonstrated mammographically. 2. Indeterminate a 0.6 x 0.5 x 0.7 cm hypoechoic nodule with indistinct margins in the retroareolar right breast, 6:00 position -Additional nodules, see full report FOLLOW-UP: Ultrasound guided biopsy x2. BI-RADS 4: Suspicious.  8/31/23 ([facility]) US-guided biopsy x 2 Site 1 - RIGHT BREAST 10:00 1 CM FN (ribbon clip): Poorly differentiated invasive ductal carcinoma (tubule formation 3/3, nuclear pleomorphism 3/3, mitotic counts 2/3), measuring 0.8 cm in maximal length in this material, focally involving a fibroadenoma. ER >90% [place] >90% Her-2 negative Ki-67 32% Site 2 - RIGHT BREAST 6:00, RETROAREOLAR (heart clip): Moderately differentiated invasive ductal carcinoma (tubule formation 3/3, nuclear pleomorphism 2/3, mitotic counts 1/3), measuring 1.0 cm in maximal length in this material. ER >90% [place] >90% Her-2 negative Ki-67 14% Malignant, concordant.   10/6/23 ([facility] Surgical Path): RIGHT MASTECTOMY: Invasive ductal carcinoma, poorly differentiated, measuring approximately 5 cm. 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 Margins, as present on this specimen: Invasive carcinoma: Anterior (LIQ): 3 mm. Posterior: > 5 mm. DCIS: All margins > 5 mm. Positive for lymphovascular invasion. Perineural invasion is identified. Fibroadenoma involved by invasive and in situ carcinoma. Prior procedure site changes. RIGHT ANTERIOR SUPERIOR MARGIN: Benign fibroadipose tissue and skeletal muscle. RIGHT BREAST MEDIAL MARGIN: Benign breast tissue. RIGHT AXILLARY CONTENTS: Six lymph node negative for tumor (0/6). RIGHT SENTINEL LYMPH NODE #1: One lymph node positive for tumor (1/1). Metastatic deposit measures at least 4 mm. Extranodal extension is present.  11/28/23 XR BONE DENSITY AXIAL: Osteopenia. spine T-Score  -1.4 [de-identified] : poorly differentiated invasive ductal carcinoma [de-identified] : ER+, [place]+, HER2-, Ki-67 32%, Oncotype RS 14 [de-identified] : At last visit she was advised to hold abema for 1 week, then resume at once daily dosing because she already filled a new rx.  She is tolerating once a day better. Creatinine has improved since last visit.  She has been hydrating (water mixed with Pedialyte) and eating more.  She has been going out more to walk outdoors.  Has minor GI upset, improved.  She has occasional twinges in the right breast.

## 2024-05-11 ENCOUNTER — RX RENEWAL (OUTPATIENT)
Age: 74
End: 2024-05-11

## 2024-05-11 RX ORDER — ATENOLOL 25 MG/1
25 TABLET ORAL
Qty: 90 | Refills: 1 | Status: ACTIVE | COMMUNITY
Start: 1900-01-01 | End: 1900-01-01

## 2024-05-17 PROBLEM — Z08 ENCOUNTER FOR FOLLOW-UP SURVEILLANCE OF BREAST CANCER: Status: ACTIVE | Noted: 2024-05-17

## 2024-05-24 ENCOUNTER — APPOINTMENT (OUTPATIENT)
Dept: BREAST CENTER | Facility: CLINIC | Age: 74
End: 2024-05-24
Payer: MEDICARE

## 2024-05-24 VITALS
BODY MASS INDEX: 20.8 KG/M2 | HEART RATE: 56 BPM | SYSTOLIC BLOOD PRESSURE: 87 MMHG | DIASTOLIC BLOOD PRESSURE: 50 MMHG | WEIGHT: 113 LBS | HEIGHT: 62 IN

## 2024-05-24 DIAGNOSIS — Z08 ENCOUNTER FOR FOLLOW-UP EXAMINATION AFTER COMPLETED TREATMENT FOR MALIGNANT NEOPLASM: ICD-10-CM

## 2024-05-24 DIAGNOSIS — Z85.3 ENCOUNTER FOR FOLLOW-UP EXAMINATION AFTER COMPLETED TREATMENT FOR MALIGNANT NEOPLASM: ICD-10-CM

## 2024-05-24 PROCEDURE — 99213 OFFICE O/P EST LOW 20 MIN: CPT

## 2024-05-24 NOTE — HISTORY OF PRESENT ILLNESS
[FreeTextEntry1] : 74 year old female, patient of Dr. Lilly, presents accompanied by her  for follow up with h/o R modified radical mastectomy, R SLNB, frozen section positive for carcinoma, ALND, right breast reconstruction utilizing TE (Dr. Purdy) on 10/6/23 of R IDC x 2 sites: of 1.9 cm lobulated mass at RIGHT 10:00 1cmfn, pathology revealed poorly differentiated IDC focally involving a fibroadenoma ER >90% SD >90% Her-2 negative Ki-67 32%; and of a  0.7 cm hypoechoic nodule with indistinct margins in the retroareolar RIGHT 6:00 position, pathology revealed moderately differentiated IDC ER >90% SD >90% Her-2 negative Ki-67 14%. First palpated by patient within the past year, then seen on screening mammogram. Biopsied using US-guidance.   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   Patient completed XRT with Dr. Wernicke on 2/6/24. Oncotype RS 14. S/p XRT. Oncotype RS 14. Patient on abemaciclib and anastrazole since March 2024, managed by Dr. Munoz.  Additionally, patient is planning to visit [facility] in December 2024 for 7 months.   Patient denies family history of breast cancer or ovarian cancer. Patient has not had genetic testing performed; deferred pre-operatively. Patient has 4 children (3 daughters, 1 son)   Patient reports history of Afib-induced stroke in June 2022 and takes Eliquis daily, with residual deficit in speech. Patient reports HTN, HLD. Denies history of DM, sleep apnea, COPD, loose/missing/infected teeth, issues with anesthesia.  Patient denies any known drug allergies.  TNM stage: T2, N1a, Mx AJCC Stage: IIA

## 2024-05-24 NOTE — PAST MEDICAL HISTORY
[History of Hormone Replacement Treatment] : has no history of hormone replacement treatment [FreeTextEntry6] : No [FreeTextEntry7] : No [FreeTextEntry8] : Yes

## 2024-05-24 NOTE — ASSESSMENT
[FreeTextEntry1] : 74 year old patient presents with  for follow up with h/o R modified radical mastectomy, R SLNB, frozen section positive for carcinoma, ALND, right breast reconstruction utilizing TE (Dr. Purdy) on 10/06/23 of R IDC x 2 sites: of 1.9 cm lobulated mass at RIGHT 10:00 1cmfn, pathology revealed poorly differentiated IDC focally involving a fibroadenoma ER >90% IN >90% Her-2 negative Ki-67 32%; and of a  0.7 cm hypoechoic nodule with indistinct margins in the retroareolar RIGHT 6:00 position, pathology revealed moderately differentiated IDC ER >90% IN >90% Her-2 negative Ki-67 14%. First palpated by patient within the past year, then seen on screening mammogram. Biopsied using US-guidance.    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   S/p XRT. Oncotype RS 14. Patient on abemaciclib and anastrazole since March 2024, managed by Dr. Munoz.   Patient without complaint. Patient with post-radiation skin changes/hyperpigmentation on physical exam. Sozo measurement obtained in office today, WNL. Plan for L DX MMG/US in July 2024. If benign, plan for re-examination and repeat sozo measurement in 6 months. Patient and  verbalize understanding and are in agreement with the plan.

## 2024-05-24 NOTE — PHYSICAL EXAM
[de-identified] : right TE in place; incisions c/d/i; post-radiation skin changes  [de-identified] : R nipple flattening reported post-biopsy

## 2024-05-24 NOTE — DATA REVIEWED
[FreeTextEntry1] : 7/6/23 ([facility]) B/L sMMG: heterogeneously dense. -There is a new mass with spiculated margins in the UOQ of the right breast, 2-3 cm from the nipple. -There are stable bilateral circumscribed masses are noted in each breast, supporting a benign nature.  FOLLOW-UP: Diagnostic mammography and targeted breast ultrasound  BI-RADS 0:  Incomplete.   8/14/23 ([facility]) R DX MMG & Targeted R US: heterogeneously dense.  1.  1.9 x 1.6 x 2.4 cm lobulated suspicious mass in the 10:00 position of the right breast 1cmfn that corresponds to the persistent finding demonstrated mammographically. 2. Indeterminate a 0.6 x 0.5 x 0.7 cm hypoechoic nodule with indistinct margins in the retroareolar right breast, 6:00 position  -Additional nodules, see full report FOLLOW-UP: Ultrasound guided biopsy x2. BI-RADS 4:  Suspicious.   8/31/23 ([facility]) US-guided biopsy x 2 Site 1 - RIGHT BREAST 10:00 1 CM FN (ribbon clip): Poorly differentiated invasive ductal carcinoma (tubule formation 3/3, nuclear pleomorphism 3/3, mitotic counts 2/3), measuring 0.8 cm in maximal length in this material, focally involving a fibroadenoma. ER >90% NV >90% Her-2 negative Ki-67 32% Site 2 - RIGHT BREAST 6:00, RETROAREOLAR (heart clip): Moderately differentiated invasive ductal carcinoma (tubule formation 3/3, nuclear pleomorphism 2/3, mitotic counts 1/3), measuring 1.0 cm in maximal length in this material.  ER >90% NV >90% Her-2 negative Ki-67 14% Malignant, concordant. Breast surgical consultation for definitive management is recommended.  10/6/23 ([facility]):  RIGHT MASTECTOMY:  Invasive ductal carcinoma, poorly differentiated, measuring approximately 5 cm. 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 Margins, as present on this specimen: Invasive carcinoma: Anterior (LIQ): 3 mm. Posterior: > 5 mm. DCIS: All margins > 5 mm. Positive for lymphovascular invasion. Perineural invasion is identified. Fibroadenoma involved by invasive and in situ carcinoma. Prior procedure site changes. RIGHT ANTERIOR SUPERIOR MARGIN: Benign fibroadipose tissue and skeletal muscle. RIGHT BREAST MEDIAL MARGIN: Benign breast tissue. RIGHT AXILLARY CONTENTS: Six lymph node negative for tumor (0/6).  RIGHT SENTINEL LYMPH NODE #1: One lymph node positive for tumor (1/1).  Metastatic deposit measures at least 4 mm. Extranodal extension is present.

## 2024-06-05 ENCOUNTER — APPOINTMENT (OUTPATIENT)
Dept: INTERNAL MEDICINE | Facility: CLINIC | Age: 74
End: 2024-06-05
Payer: MEDICARE

## 2024-06-05 VITALS
SYSTOLIC BLOOD PRESSURE: 113 MMHG | OXYGEN SATURATION: 97 % | DIASTOLIC BLOOD PRESSURE: 64 MMHG | HEART RATE: 69 BPM | HEIGHT: 62 IN | TEMPERATURE: 97.6 F | BODY MASS INDEX: 20.8 KG/M2 | WEIGHT: 113 LBS

## 2024-06-05 DIAGNOSIS — Z87.898 PERSONAL HISTORY OF OTHER SPECIFIED CONDITIONS: ICD-10-CM

## 2024-06-05 DIAGNOSIS — I63.9 CEREBRAL INFARCTION, UNSPECIFIED: ICD-10-CM

## 2024-06-05 DIAGNOSIS — E78.5 HYPERLIPIDEMIA, UNSPECIFIED: ICD-10-CM

## 2024-06-05 DIAGNOSIS — I10 ESSENTIAL (PRIMARY) HYPERTENSION: ICD-10-CM

## 2024-06-05 DIAGNOSIS — Z00.00 ENCOUNTER FOR GENERAL ADULT MEDICAL EXAMINATION W/OUT ABNORMAL FINDINGS: ICD-10-CM

## 2024-06-05 DIAGNOSIS — R73.09 OTHER ABNORMAL GLUCOSE: ICD-10-CM

## 2024-06-05 DIAGNOSIS — I48.91 UNSPECIFIED ATRIAL FIBRILLATION: ICD-10-CM

## 2024-06-05 DIAGNOSIS — M85.80 OTHER SPECIFIED DISORDERS OF BONE DENSITY AND STRUCTURE, UNSPECIFIED SITE: ICD-10-CM

## 2024-06-05 PROCEDURE — G0439: CPT

## 2024-06-05 RX ORDER — ROSUVASTATIN CALCIUM 20 MG/1
20 TABLET, FILM COATED ORAL
Qty: 90 | Refills: 1 | Status: ACTIVE | COMMUNITY
Start: 2022-07-12 | End: 1900-01-01

## 2024-06-05 RX ORDER — CALCIUM CARBONATE/VITAMIN D3 600 MG-10
600-10 TABLET ORAL
Refills: 0 | Status: ACTIVE | COMMUNITY
Start: 2024-06-05

## 2024-06-05 RX ORDER — GABAPENTIN 300 MG/1
300 CAPSULE ORAL
Qty: 90 | Refills: 0 | Status: COMPLETED | COMMUNITY
Start: 2024-01-18 | End: 2024-06-05

## 2024-06-05 RX ORDER — APIXABAN 5 MG/1
5 TABLET, FILM COATED ORAL
Qty: 180 | Refills: 1 | Status: ACTIVE | COMMUNITY
Start: 2022-07-07 | End: 1900-01-01

## 2024-06-05 RX ORDER — GABAPENTIN 300 MG/1
300 CAPSULE ORAL
Qty: 90 | Refills: 0 | Status: COMPLETED | COMMUNITY
Start: 2024-02-14 | End: 2024-06-05

## 2024-06-05 RX ORDER — AMLODIPINE BESYLATE 5 MG/1
5 TABLET ORAL DAILY
Qty: 90 | Refills: 1 | Status: ACTIVE | COMMUNITY
Start: 2022-06-15 | End: 1900-01-01

## 2024-06-06 ENCOUNTER — APPOINTMENT (OUTPATIENT)
Dept: PLASTIC SURGERY | Facility: CLINIC | Age: 74
End: 2024-06-06
Payer: MEDICARE

## 2024-06-06 ENCOUNTER — APPOINTMENT (OUTPATIENT)
Dept: HEMATOLOGY ONCOLOGY | Facility: CLINIC | Age: 74
End: 2024-06-06
Payer: MEDICARE

## 2024-06-06 VITALS
OXYGEN SATURATION: 99 % | TEMPERATURE: 98.1 F | HEIGHT: 62 IN | SYSTOLIC BLOOD PRESSURE: 116 MMHG | RESPIRATION RATE: 18 BRPM | HEART RATE: 64 BPM | DIASTOLIC BLOOD PRESSURE: 64 MMHG | WEIGHT: 115 LBS | BODY MASS INDEX: 21.16 KG/M2

## 2024-06-06 DIAGNOSIS — C50.911 MALIGNANT NEOPLASM OF UNSPECIFIED SITE OF RIGHT FEMALE BREAST: ICD-10-CM

## 2024-06-06 DIAGNOSIS — Z42.1 ENCOUNTER FOR BREAST RECONSTRUCTION FOLLOWING MASTECTOMY: ICD-10-CM

## 2024-06-06 PROCEDURE — G2211 COMPLEX E/M VISIT ADD ON: CPT

## 2024-06-06 PROCEDURE — 99214 OFFICE O/P EST MOD 30 MIN: CPT

## 2024-06-06 RX ORDER — ANASTROZOLE TABLETS 1 MG/1
1 TABLET ORAL DAILY
Qty: 90 | Refills: 1 | Status: ACTIVE | COMMUNITY
Start: 2024-03-07

## 2024-06-06 RX ORDER — ABEMACICLIB 150 MG/1
150 TABLET ORAL DAILY
Qty: 28 | Refills: 5 | Status: ACTIVE | COMMUNITY
Start: 2024-03-07

## 2024-06-06 NOTE — PHYSICAL EXAM
[Normal] : affect appropriate [de-identified] : s/p right mastectomy, with tissue expander in place; surgical scars healing well  [de-identified] : seborrheic keratosis in right breast area

## 2024-06-06 NOTE — HISTORY OF PRESENT ILLNESS
[Disease: _____________________] : Disease: [unfilled] [T: ___] : T[unfilled] [N: ___] : N[unfilled] [M: ___] : M[unfilled] [AJCC Stage: ____] : AJCC Stage: [unfilled] [de-identified] : 74 year old female with right breast cancer s/p 10/6/23 R modified radical mastectomy, R SLNB, ALND, TE reconstruction, adjuvant RT (completed 2/6/2024) is here today for f/u.  She has been on Verzenio and anastrozole since mid March 2024. Verzenio was decreased to 150mg daily in mid-April 2024 due to side effects. Here today for f/u.   She has a history of HTN, HLD, Afib-induced stroke in June, affecting her speech and cognitive functioning.  She takes Eliquis.  First palpated by patient within the past year, then seen on screening mammogram.   1/6/22 DEXA: osteopenia of the lumbar spine (T score -1.1)  7/6/23 (R) B/L sMMG: heterogeneously dense. -There is a new mass with spiculated margins in the UOQ of the right breast, 2-3 cm from the nipple. -There are stable bilateral circumscribed masses are noted in each breast, supporting a benign nature. FOLLOW-UP: Diagnostic mammography and targeted breast ultrasound BI-RADS 0: Incomplete.  8/14/23 ([facility]) R DX MMG & Targeted R US: heterogeneously dense. 1. 1.9 x 1.6 x 2.4 cm lobulated suspicious mass in the 10:00 position of the right breast 1cmfn that corresponds to the persistent finding demonstrated mammographically. 2. Indeterminate a 0.6 x 0.5 x 0.7 cm hypoechoic nodule with indistinct margins in the retroareolar right breast, 6:00 position -Additional nodules, see full report FOLLOW-UP: Ultrasound guided biopsy x2. BI-RADS 4: Suspicious.  8/31/23 ([facility]) US-guided biopsy x 2 Site 1 - RIGHT BREAST 10:00 1 CM FN (ribbon clip): Poorly differentiated invasive ductal carcinoma (tubule formation 3/3, nuclear pleomorphism 3/3, mitotic counts 2/3), measuring 0.8 cm in maximal length in this material, focally involving a fibroadenoma. ER >90% NE >90% Her-2 negative Ki-67 32% Site 2 - RIGHT BREAST 6:00, RETROAREOLAR (heart clip): Moderately differentiated invasive ductal carcinoma (tubule formation 3/3, nuclear pleomorphism 2/3, mitotic counts 1/3), measuring 1.0 cm in maximal length in this material. ER >90% NE >90% Her-2 negative Ki-67 14% Malignant, concordant.   10/6/23 ([facility] Surgical Path): RIGHT MASTECTOMY: Invasive ductal carcinoma, poorly differentiated, measuring approximately 5 cm. 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 Margins, as present on this specimen: Invasive carcinoma: Anterior (LIQ): 3 mm. Posterior: > 5 mm. DCIS: All margins > 5 mm. Positive for lymphovascular invasion. Perineural invasion is identified. Fibroadenoma involved by invasive and in situ carcinoma. Prior procedure site changes. RIGHT ANTERIOR SUPERIOR MARGIN: Benign fibroadipose tissue and skeletal muscle. RIGHT BREAST MEDIAL MARGIN: Benign breast tissue. RIGHT AXILLARY CONTENTS: Six lymph node negative for tumor (0/6). RIGHT SENTINEL LYMPH NODE #1: One lymph node positive for tumor (1/1). Metastatic deposit measures at least 4 mm. Extranodal extension is present.  11/28/23 XR BONE DENSITY AXIAL: Osteopenia. spine T-Score  -1.4 [de-identified] : poorly differentiated invasive ductal carcinoma [de-identified] : ER+, WA+, HER2-, Ki-67 32%, Oncotype RS 14 [de-identified] : She saw Dr. Duran on 5/24/24 and is scheduled for left MG on 7/8/24.

## 2024-06-13 NOTE — HISTORY OF PRESENT ILLNESS
[FreeTextEntry1] : 73 y/o female presents 6 months s/p right breast reconstruction utilizing tissue expander on 10/06/2023. Denies any f/c/n/v. Patient is taking gabapentin prn for pain, reports improvement in pain overall. Patient finished radiation on 02/06/2024. Patient is applying Aquaphor on her breast. Patient has hyperpigmentation on her right breast. Patient is here to discuss second stage surgery.  PE: right TE in place, well healed, 175cc in 300cc, slightly smaller than left  radiation induced pigmentation changes, superficial desquamated skin removed with gauze Stigmata of recent RT to right chest   A/P Patient is currently on gabapentin one pill 2-3 x week.  Scrub residual dead skin in the shower Right TE to implant replacement and possible left mastopexy reduction for symmetry in August to permit trip to [place] in December. I reviewed all the details with patient and her . We will excise her previous mastectomy scar to remove the TE. Given her significant RT induced changes I will likely need to perform additional modifications to her right breast pocket such as capsulectomy vs capsulotomy. I will place a slightly larger implant than what is currently filled in her TE - 200-250cc range.  I reviewed the difference between silicone and saline and they have decided to proceed with silicone.  I reviewed all the risks of the surgery including but not limited to bleeding, infection, wound healing issues, capsular contracture, persistent asymmetry, implant rupture, the need for MRIs to monitor for rupture and BOZENA-ALCL. The patient and her  understand and would like to proceed.  I also reviewed the details of a contralateral mastopexy/reduction. They would like to think about this if they want to proceed in this fashion.

## 2024-06-17 NOTE — HISTORY OF PRESENT ILLNESS
[Spouse] : spouse [FreeTextEntry1] : 74 year old woman presenting for AWV  [de-identified] : diagnosed with stage 2 infiltrating ductal carcinoma of R breast, s/p R mastectomy and LN dissection October 2023 and RT. Now on anastrazole amd verzenio.   She just had labs (CBC and CMP) to see Dr. Munoz. Stable mild anemia and leukopenia.   Dr. Alpa Garza is her cardiologist, has follow up in August.  Still has aphasia after stroke. Her produced speech sometimes consists of words that don't quite make sense in context.  She does seem to have able to understand and answer simple yes/no questions.  Significant fatigue with the Verzenio so she has less energy to do things. They bought an apartment in [place] last year but haven't been able to go back because of all the cancer treatments.  They hope to return to the [facility] for the winter, but are aware that they have to get the okay from her oncologist.  Hasn't seen dentist in a year, due.  She and her  are aware that she is due for colonoscopy this year, but have had to prioritize follow-up of breast cancer and stroke.  They state they will try to schedule her for colonoscopy this fall/winter.

## 2024-06-17 NOTE — HEALTH RISK ASSESSMENT
[0] : 2) Feeling down, depressed, or hopeless: Not at all (0) [[id number]] : 0 [Never] : Never [Patient reported colonoscopy was abnormal] : Patient reported colonoscopy was abnormal [ColonoscopyDate] : 11/14 [ColonoscopyComments] : 3 mm polyp, diverticulosis.

## 2024-07-08 ENCOUNTER — NON-APPOINTMENT (OUTPATIENT)
Age: 74
End: 2024-07-08

## 2024-07-10 ENCOUNTER — NON-APPOINTMENT (OUTPATIENT)
Age: 74
End: 2024-07-10

## 2024-08-05 ENCOUNTER — APPOINTMENT (OUTPATIENT)
Dept: INTERNAL MEDICINE | Facility: CLINIC | Age: 74
End: 2024-08-05

## 2024-08-05 ENCOUNTER — NON-APPOINTMENT (OUTPATIENT)
Age: 74
End: 2024-08-05

## 2024-08-05 PROBLEM — Z01.812 PRE-OPERATIVE LABORATORY EXAMINATION: Status: ACTIVE | Noted: 2020-10-28

## 2024-08-05 PROBLEM — Z01.810 PRE-OPERATIVE CARDIOVASCULAR EXAMINATION: Status: ACTIVE | Noted: 2020-10-28

## 2024-08-05 PROCEDURE — 99213 OFFICE O/P EST LOW 20 MIN: CPT

## 2024-08-05 PROCEDURE — 93000 ELECTROCARDIOGRAM COMPLETE: CPT

## 2024-08-05 PROCEDURE — G2211 COMPLEX E/M VISIT ADD ON: CPT

## 2024-08-14 NOTE — RESULTS/DATA
[] : results reviewed [de-identified] : Stable pancytopenia [de-identified] : unremarkable  [de-identified] : Sinus bradycardia, first-degree AV block [de-identified] : Slightly elevated creatinine that will be monitored, otherwise unremarkable

## 2024-08-14 NOTE — RESULTS/DATA
[] : results reviewed [de-identified] : Stable pancytopenia [de-identified] : unremarkable  [de-identified] : Slightly elevated creatinine that will be monitored, otherwise unremarkable [de-identified] : Sinus bradycardia, first-degree AV block

## 2024-08-14 NOTE — PHYSICAL EXAM
[No Acute Distress] : no acute distress [Well Nourished] : well nourished [Well Developed] : well developed [Well-Appearing] : well-appearing [Normal Sclera/Conjunctiva] : normal sclera/conjunctiva [Normal Outer Ear/Nose] : the outer ears and nose were normal in appearance [Normal Oropharynx] : the oropharynx was normal [No JVD] : no jugular venous distention [No Lymphadenopathy] : no lymphadenopathy [Supple] : supple [Thyroid Normal, No Nodules] : the thyroid was normal and there were no nodules present [No Respiratory Distress] : no respiratory distress  [No Accessory Muscle Use] : no accessory muscle use [Clear to Auscultation] : lungs were clear to auscultation bilaterally [Normal Rate] : normal rate  [Regular Rhythm] : with a regular rhythm [Normal S1, S2] : normal S1 and S2 [No Murmur] : no murmur heard [No Edema] : there was no peripheral edema [No Extremity Clubbing/Cyanosis] : no extremity clubbing/cyanosis [Soft] : abdomen soft [Non Tender] : non-tender [Non-distended] : non-distended [No Masses] : no abdominal mass palpated [No HSM] : no HSM [Normal Bowel Sounds] : normal bowel sounds [Normal Posterior Cervical Nodes] : no posterior cervical lymphadenopathy [Normal Anterior Cervical Nodes] : no anterior cervical lymphadenopathy [No CVA Tenderness] : no CVA  tenderness [No Spinal Tenderness] : no spinal tenderness [No Joint Swelling] : no joint swelling [Grossly Normal Strength/Tone] : grossly normal strength/tone [No Rash] : no rash [Coordination Grossly Intact] : coordination grossly intact [Normal Gait] : normal gait [Normal Affect] : the affect was normal [Alert and Oriented x3] : oriented to person, place, and time [Normal Insight/Judgement] : insight and judgment were intact [de-identified] : aphasic

## 2024-08-14 NOTE — PHYSICAL EXAM
[No Acute Distress] : no acute distress [Well Nourished] : well nourished [Well Developed] : well developed [Well-Appearing] : well-appearing [Normal Sclera/Conjunctiva] : normal sclera/conjunctiva [Normal Outer Ear/Nose] : the outer ears and nose were normal in appearance [Normal Oropharynx] : the oropharynx was normal [No JVD] : no jugular venous distention [No Lymphadenopathy] : no lymphadenopathy [Supple] : supple [Thyroid Normal, No Nodules] : the thyroid was normal and there were no nodules present [No Respiratory Distress] : no respiratory distress  [No Accessory Muscle Use] : no accessory muscle use [Clear to Auscultation] : lungs were clear to auscultation bilaterally [Normal Rate] : normal rate  [Regular Rhythm] : with a regular rhythm [Normal S1, S2] : normal S1 and S2 [No Murmur] : no murmur heard [No Edema] : there was no peripheral edema [No Extremity Clubbing/Cyanosis] : no extremity clubbing/cyanosis [Soft] : abdomen soft [Non Tender] : non-tender [Non-distended] : non-distended [No Masses] : no abdominal mass palpated [No HSM] : no HSM [Normal Bowel Sounds] : normal bowel sounds [Normal Posterior Cervical Nodes] : no posterior cervical lymphadenopathy [Normal Anterior Cervical Nodes] : no anterior cervical lymphadenopathy [No CVA Tenderness] : no CVA  tenderness [No Spinal Tenderness] : no spinal tenderness [No Joint Swelling] : no joint swelling [Grossly Normal Strength/Tone] : grossly normal strength/tone [No Rash] : no rash [Coordination Grossly Intact] : coordination grossly intact [Normal Gait] : normal gait [Normal Affect] : the affect was normal [Alert and Oriented x3] : oriented to person, place, and time [Normal Insight/Judgement] : insight and judgment were intact [de-identified] : aphasic

## 2024-08-14 NOTE — ASSESSMENT
[Modify anticoagulant treatment prior to procedure] : Modify anticoagulant treatment prior to procedure [Patient Optimized for Surgery] : Patient optimized for surgery [No Further Testing Recommended] : no further testing recommended [FreeTextEntry4] : 74 year old woman with atrial fibrillation with prior ischemic stroke frontal lobe (June 2024), hypertension, osteopenia, history of infiltrating ductal carcinoma right breast s/p modified radical mastectomy currently on Verzenio and anastrozole presenting for preoperative evaluation for right breast reconstruction.  She is at elevated cardiovascular risk for an intermediate risk procedure, but is medically optimized and does not require further evaluation.  She will hold Eliquis 2 to 3 days prior to procedure, but continue rosuvastatin, atenolol, amlodipine. [FreeTextEntry5] : hold eliquis for 2-3 days prior to procedure

## 2024-08-14 NOTE — PHYSICAL EXAM
[No Acute Distress] : no acute distress [Well Nourished] : well nourished [Well Developed] : well developed [Well-Appearing] : well-appearing [Normal Sclera/Conjunctiva] : normal sclera/conjunctiva [Normal Outer Ear/Nose] : the outer ears and nose were normal in appearance [Normal Oropharynx] : the oropharynx was normal [No JVD] : no jugular venous distention [No Lymphadenopathy] : no lymphadenopathy [Supple] : supple [Thyroid Normal, No Nodules] : the thyroid was normal and there were no nodules present [No Respiratory Distress] : no respiratory distress  [No Accessory Muscle Use] : no accessory muscle use [Clear to Auscultation] : lungs were clear to auscultation bilaterally [Normal Rate] : normal rate  [Regular Rhythm] : with a regular rhythm [Normal S1, S2] : normal S1 and S2 [No Murmur] : no murmur heard [No Edema] : there was no peripheral edema [No Extremity Clubbing/Cyanosis] : no extremity clubbing/cyanosis [Soft] : abdomen soft [Non Tender] : non-tender [Non-distended] : non-distended [No Masses] : no abdominal mass palpated [No HSM] : no HSM [Normal Bowel Sounds] : normal bowel sounds [Normal Posterior Cervical Nodes] : no posterior cervical lymphadenopathy [Normal Anterior Cervical Nodes] : no anterior cervical lymphadenopathy [No CVA Tenderness] : no CVA  tenderness [No Spinal Tenderness] : no spinal tenderness [No Joint Swelling] : no joint swelling [Grossly Normal Strength/Tone] : grossly normal strength/tone [No Rash] : no rash [Coordination Grossly Intact] : coordination grossly intact [Normal Gait] : normal gait [Normal Affect] : the affect was normal [Alert and Oriented x3] : oriented to person, place, and time [Normal Insight/Judgement] : insight and judgment were intact [de-identified] : aphasic

## 2024-08-14 NOTE — HISTORY OF PRESENT ILLNESS
[Good (7-10 METs)] : Good (7-10 METs) [Aortic Stenosis] : no aortic stenosis [Atrial Fibrillation] : atrial fibrillation [Coronary Artery Disease] : no coronary artery disease [Recent Myocardial Infarction] : no recent myocardial infarction [Implantable Device/Pacemaker] : no implantable device/pacemaker [No Pertinent Pulmonary History] : no history of asthma, COPD, sleep apnea, or smoking [No Adverse Anesthesia Reaction] : no adverse anesthesia reaction in self or family member [Chronic Anticoagulation] : chronic anticoagulation [Chronic Kidney Disease] : no chronic kidney disease [Diabetes] : no diabetes [FreeTextEntry1] : R breast reconstruction [FreeTextEntry2] : 9/3/24 [FreeTextEntry3] : Dr. Purdy [FreeTextEntry4] : 74 year old woman with atrial fibrillation with prior ischemic stroke frontal lobe (June 2024), hypertension, osteopenia, history of infiltrating ductal carcinoma right breast s/p modified radical mastectomy currently on Verzenio and anastrozole presenting for preoperative evaluation for right breast reconstruction.  seeing Dr. Goel (new cardiologist) on 8/17 [phone]  We reviewed lab results that she had done at [facility] in July: LDL 48 is at goal, A1c is normal at 5.0, TSH within normal limits.  She will have her preoperative blood work done with her next labs she needs to have done for Dr. Munoz.

## 2024-08-14 NOTE — HISTORY OF PRESENT ILLNESS
[Good (7-10 METs)] : Good (7-10 METs) [Aortic Stenosis] : no aortic stenosis [Atrial Fibrillation] : atrial fibrillation [Coronary Artery Disease] : no coronary artery disease [Recent Myocardial Infarction] : no recent myocardial infarction [Implantable Device/Pacemaker] : no implantable device/pacemaker [No Pertinent Pulmonary History] : no history of asthma, COPD, sleep apnea, or smoking [No Adverse Anesthesia Reaction] : no adverse anesthesia reaction in self or family member [Chronic Anticoagulation] : chronic anticoagulation [Chronic Kidney Disease] : no chronic kidney disease [Diabetes] : no diabetes [FreeTextEntry1] : R breast reconstruction [FreeTextEntry2] : 9/3/24 [FreeTextEntry3] : Dr. Purdy [FreeTextEntry4] : 74 year old woman with atrial fibrillation with prior ischemic stroke frontal lobe (June 2024), hypertension, osteopenia, history of infiltrating ductal carcinoma right breast s/p modified radical mastectomy currently on Verzenio and anastrozole presenting for preoperative evaluation for right breast reconstruction.  seeing Dr. Goel (new cardiologist) on 8/17 [phone]  We reviewed lab results that she had done at [facility] in July: LDL 48 is at goal, A1c is normal at 5.0, TSH within normal limits.  She will have her preoperative blood work done with her next labs she needs to have done for Dr. Munoz.

## 2024-08-14 NOTE — HISTORY OF PRESENT ILLNESS
[Good (7-10 METs)] : Good (7-10 METs) [Aortic Stenosis] : no aortic stenosis [Atrial Fibrillation] : atrial fibrillation [Coronary Artery Disease] : no coronary artery disease [Recent Myocardial Infarction] : no recent myocardial infarction [Implantable Device/Pacemaker] : no implantable device/pacemaker [No Pertinent Pulmonary History] : no history of asthma, COPD, sleep apnea, or smoking [No Adverse Anesthesia Reaction] : no adverse anesthesia reaction in self or family member [Chronic Anticoagulation] : chronic anticoagulation [Chronic Kidney Disease] : no chronic kidney disease [Diabetes] : no diabetes [FreeTextEntry1] : R breast reconstruction [FreeTextEntry2] : 9/3/24 [FreeTextEntry3] : Dr. Purdy [FreeTextEntry4] : 74 year old woman with atrial fibrillation with prior ischemic stroke frontal lobe (June 2024), hypertension, osteopenia, history of infiltrating ductal carcinoma right breast s/p modified radical mastectomy currently on Verzenio and anastrozole presenting for preoperative evaluation for right breast reconstruction.  seeing Dr. Goel (new cardiologist) on 8/17 [phone]  We reviewed lab results that she had done at [facility] in July: LDL 48 is at goal, A1c is normal at 5.0, TSH within normal limits.  She will have her preoperative blood work done with her next labs she needs to have done for Dr. Munoz.

## 2024-08-28 ENCOUNTER — APPOINTMENT (OUTPATIENT)
Dept: PLASTIC SURGERY | Facility: CLINIC | Age: 74
End: 2024-08-28

## 2024-08-30 RX ORDER — TRAMADOL HYDROCHLORIDE 50 MG/1
50 TABLET, COATED ORAL
Qty: 20 | Refills: 0 | Status: ACTIVE | COMMUNITY
Start: 2024-08-30 | End: 1900-01-01

## 2024-08-30 NOTE — ASU PATIENT PROFILE, ADULT - NSICDXPASTSURGICALHX_GEN_ALL_CORE_FT
PAST SURGICAL HISTORY:  H/O right mastectomy     Surgery, elective Cervical loop electrosurgical Excision (LEEP)     PAST SURGICAL HISTORY:  H/O right mastectomy

## 2024-08-30 NOTE — ASU PATIENT PROFILE, ADULT - NS PREOP UNDERSTANDS INFO
No solids after midnight, okay to have water or apple juice up until 0800.No dairy and No gum. Wear comfortable clothing. No lotions, contacts or perfumes. Bring picture ID and insurance card. Escort is required to bring home./yes

## 2024-08-30 NOTE — ASU PATIENT PROFILE, ADULT - NSICDXPASTMEDICALHX_GEN_ALL_CORE_FT
PAST MEDICAL HISTORY:  Cerebrovascular accident (CVA) speech and cognitive impairment    Cervical dysplasia     HTN (hypertension)     Hyperlipidemia     Multiple thyroid nodules      PAST MEDICAL HISTORY:  Cerebrovascular accident (CVA) speech and cognitive impairment    Cervical dysplasia     HTN (hypertension)     Hyperlipidemia     Multiple thyroid nodules     Paroxysmal atrial fibrillation     PVCs (premature ventricular contractions)      PAST MEDICAL HISTORY:  Aphasia     Breast cancer     Cerebrovascular accident (CVA)     HTN (hypertension)     Hyperlipidemia     Multiple thyroid nodules     Paroxysmal atrial fibrillation     PVCs (premature ventricular contractions)

## 2024-09-02 ENCOUNTER — TRANSCRIPTION ENCOUNTER (OUTPATIENT)
Age: 74
End: 2024-09-02

## 2024-09-03 ENCOUNTER — OUTPATIENT (OUTPATIENT)
Dept: OUTPATIENT SERVICES | Facility: HOSPITAL | Age: 74
LOS: 1 days | Discharge: HOME | End: 2024-09-03
Payer: MEDICARE

## 2024-09-03 ENCOUNTER — TRANSCRIPTION ENCOUNTER (OUTPATIENT)
Age: 74
End: 2024-09-03

## 2024-09-03 ENCOUNTER — RESULT REVIEW (OUTPATIENT)
Age: 74
End: 2024-09-03

## 2024-09-03 VITALS
DIASTOLIC BLOOD PRESSURE: 53 MMHG | TEMPERATURE: 98 F | SYSTOLIC BLOOD PRESSURE: 149 MMHG | RESPIRATION RATE: 16 BRPM | HEART RATE: 52 BPM | WEIGHT: 117.73 LBS | HEIGHT: 62 IN | OXYGEN SATURATION: 100 %

## 2024-09-03 VITALS
OXYGEN SATURATION: 97 % | HEART RATE: 69 BPM | DIASTOLIC BLOOD PRESSURE: 71 MMHG | TEMPERATURE: 97 F | RESPIRATION RATE: 16 BRPM | SYSTOLIC BLOOD PRESSURE: 145 MMHG

## 2024-09-03 DIAGNOSIS — Z41.9 ENCOUNTER FOR PROCEDURE FOR PURPOSES OTHER THAN REMEDYING HEALTH STATE, UNSPECIFIED: Chronic | ICD-10-CM

## 2024-09-03 DIAGNOSIS — Z90.11 ACQUIRED ABSENCE OF RIGHT BREAST AND NIPPLE: Chronic | ICD-10-CM

## 2024-09-03 PROBLEM — N87.9 DYSPLASIA OF CERVIX UTERI, UNSPECIFIED: Chronic | Status: INACTIVE | Noted: 2017-01-16 | Resolved: 2024-09-03

## 2024-09-03 PROCEDURE — 88305 TISSUE EXAM BY PATHOLOGIST: CPT | Mod: 26

## 2024-09-03 PROCEDURE — 11970 RPLCMT TISS XPNDR PERM IMPLT: CPT | Mod: RT

## 2024-09-03 PROCEDURE — 88302 TISSUE EXAM BY PATHOLOGIST: CPT | Mod: 26

## 2024-09-03 PROCEDURE — 19380 REVJ RECONSTRUCTED BREAST: CPT | Mod: RT

## 2024-09-03 DEVICE — IMPLANTABLE DEVICE: Type: IMPLANTABLE DEVICE | Site: RIGHT | Status: FUNCTIONAL

## 2024-09-03 RX ORDER — OXYCODONE HYDROCHLORIDE 5 MG/1
5 TABLET ORAL ONCE
Refills: 0 | Status: DISCONTINUED | OUTPATIENT
Start: 2024-09-03 | End: 2024-09-03

## 2024-09-03 RX ORDER — APREPITANT 40 MG/1
40 CAPSULE ORAL ONCE
Refills: 0 | Status: COMPLETED | OUTPATIENT
Start: 2024-09-03 | End: 2024-09-03

## 2024-09-03 RX ORDER — GABAPENTIN 100 MG
1 CAPSULE ORAL
Refills: 0 | DISCHARGE

## 2024-09-03 RX ORDER — ABEMACICLIB 200 MG/1
1 TABLET ORAL
Refills: 0 | DISCHARGE

## 2024-09-03 RX ORDER — ROSUVASTATIN CALCIUM 10 MG/1
1 TABLET ORAL
Refills: 0 | DISCHARGE

## 2024-09-03 RX ORDER — ACETAMINOPHEN 325 MG/1
1000 TABLET ORAL ONCE
Refills: 0 | Status: COMPLETED | OUTPATIENT
Start: 2024-09-03 | End: 2024-09-03

## 2024-09-03 RX ORDER — ANASTROZOLE 1 MG/1
1 TABLET ORAL
Refills: 0 | DISCHARGE

## 2024-09-03 RX ADMIN — APREPITANT 40 MILLIGRAM(S): 40 CAPSULE ORAL at 11:21

## 2024-09-03 RX ADMIN — OXYCODONE HYDROCHLORIDE 5 MILLIGRAM(S): 5 TABLET ORAL at 18:25

## 2024-09-03 RX ADMIN — ACETAMINOPHEN 1000 MILLIGRAM(S): 325 TABLET ORAL at 11:21

## 2024-09-03 RX ADMIN — OXYCODONE HYDROCHLORIDE 5 MILLIGRAM(S): 5 TABLET ORAL at 17:54

## 2024-09-03 NOTE — ASU DISCHARGE PLAN (ADULT/PEDIATRIC) - FOLLOW UP APPOINTMENTS
[facility] [Fever] : no fever [Eye Pain] : no eye pain [Earache] : no earache [Chest Pain] : no chest pain [Cough] : no cough [Abdominal Pain] : no abdominal pain [As Noted in HPI] : as noted in HPI [Limb Pain] : limb pain [Skin Wound] : skin wound [FreeTextEntry9] : Pain to left plantar heel [de-identified] : bilateral lower leg venous stasis ulceration down to skin with stasis dermatitis improving

## 2024-09-03 NOTE — ASU DISCHARGE PLAN (ADULT/PEDIATRIC) - CARE PROVIDER_API CALL
Zari Purdy  Plastic Surgery  [address] [place] [id number]  Phone: [phone]  Fax: [phone]  Follow Up Time: 1 week

## 2024-09-03 NOTE — ASU DISCHARGE PLAN (ADULT/PEDIATRIC) - ASU DC SPECIAL INSTRUCTIONSFT
Please follow all pre operative instructions by Dr Purdy.    No heavy lifting, no exercise, no activities that increase your heart rate until after cleared by Dr Purdy Please follow all pre operative instructions by Dr Purdy.    You may restart your anticoagulation in 24 hours.    Please keep the surgical dressing clean, dry, and in place for 3 days.  After 3 days you may shower but please do not scrub or rub directly over the operative site and pat dry.    No heavy lifting, no exercise, no activities that increase your heart rate until after cleared by Dr Purdy

## 2024-09-03 NOTE — ASU DISCHARGE PLAN (ADULT/PEDIATRIC) - NS MD DC FALL RISK RISK
For information on Fall & Injury Prevention, visit: [email or web address] OR  [email or web address] OR  https://www.cdc.gov/steadi/patient.html

## 2024-09-03 NOTE — BRIEF OPERATIVE NOTE - OPERATION/FINDINGS
Removal of right breast tissue expander, capsulotomies and placement of permanent silicone breast implant

## 2024-09-03 NOTE — BRIEF OPERATIVE NOTE - NSICDXBRIEFPROCEDURE_GEN_ALL_CORE_FT
PROCEDURES:  Reconstruction, breast, stage 2, with tissue expander removal and implant insertion 03-Sep-2024 13:06:27  Bola Mckee

## 2024-09-09 LAB — SURGICAL PATHOLOGY STUDY: SIGNIFICANT CHANGE UP

## 2024-09-12 ENCOUNTER — APPOINTMENT (OUTPATIENT)
Dept: HEMATOLOGY ONCOLOGY | Facility: CLINIC | Age: 74
End: 2024-09-12
Payer: MEDICARE

## 2024-09-12 VITALS
BODY MASS INDEX: 20.98 KG/M2 | DIASTOLIC BLOOD PRESSURE: 73 MMHG | WEIGHT: 114 LBS | RESPIRATION RATE: 18 BRPM | HEART RATE: 58 BPM | SYSTOLIC BLOOD PRESSURE: 118 MMHG | HEIGHT: 62 IN | OXYGEN SATURATION: 99 %

## 2024-09-12 DIAGNOSIS — C50.911 MALIGNANT NEOPLASM OF UNSPECIFIED SITE OF RIGHT FEMALE BREAST: ICD-10-CM

## 2024-09-12 PROCEDURE — 99214 OFFICE O/P EST MOD 30 MIN: CPT

## 2024-09-12 NOTE — PHYSICAL EXAM
[Normal] : affect appropriate [de-identified] : deferred, wearing post-surgical bra with JAVIER drain

## 2024-09-12 NOTE — HISTORY OF PRESENT ILLNESS
[Disease: _____________________] : Disease: [unfilled] [T: ___] : T[unfilled] [N: ___] : N[unfilled] [M: ___] : M[unfilled] [AJCC Stage: ____] : AJCC Stage: [unfilled] [de-identified] : 74 year old female with right breast cancer s/p 10/6/23 R modified radical mastectomy, R SLNB, ALND, TE reconstruction, adjuvant RT (completed 2/6/2024) is here today for f/u.  She has been on Verzenio and anastrozole since mid March 2024. Verzenio was decreased to 150mg daily in mid-April 2024 due to side effects.  She underwent right TE to implant exchange on 9/3/24 with Dr. Purdy.  Here today for f/u.   She has a history of HTN, HLD, Afib-induced stroke in June, affecting her speech and cognitive functioning.  She takes Eliquis.  First palpated by patient within the past year, then seen on screening mammogram.   1/6/22 DEXA: osteopenia of the lumbar spine (T score -1.1)  7/6/23 ([facility]) B/L sMMG: heterogeneously dense. -There is a new mass with spiculated margins in the UOQ of the right breast, 2-3 cm from the nipple. -There are stable bilateral circumscribed masses are noted in each breast, supporting a benign nature. FOLLOW-UP: Diagnostic mammography and targeted breast ultrasound BI-RADS 0: Incomplete.  8/14/23 ([facility]) R DX MMG & Targeted R US: heterogeneously dense. 1. 1.9 x 1.6 x 2.4 cm lobulated suspicious mass in the 10:00 position of the right breast 1cmfn that corresponds to the persistent finding demonstrated mammographically. 2. Indeterminate a 0.6 x 0.5 x 0.7 cm hypoechoic nodule with indistinct margins in the retroareolar right breast, 6:00 position -Additional nodules, see full report FOLLOW-UP: Ultrasound guided biopsy x2. BI-RADS 4: Suspicious.  8/31/23 ([facility] Path) US-guided biopsy x 2 Site 1 - RIGHT BREAST 10:00 1 CM FN (ribbon clip): Poorly differentiated invasive ductal carcinoma (tubule formation 3/3, nuclear pleomorphism 3/3, mitotic counts 2/3), measuring 0.8 cm in maximal length in this material, focally involving a fibroadenoma. ER >90% DC >90% Her-2 negative Ki-67 32% Site 2 - RIGHT BREAST 6:00, RETROAREOLAR (heart clip): Moderately differentiated invasive ductal carcinoma (tubule formation 3/3, nuclear pleomorphism 2/3, mitotic counts 1/3), measuring 1.0 cm in maximal length in this material. ER >90% DC >90% Her-2 negative Ki-67 14% Malignant, concordant.   10/6/23 ([facility] Surgical Path): RIGHT MASTECTOMY: Invasive ductal carcinoma, poorly differentiated, measuring approximately 5 cm. 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 Margins, as present on this specimen: Invasive carcinoma: Anterior (LIQ): 3 mm. Posterior: > 5 mm. DCIS: All margins > 5 mm. Positive for lymphovascular invasion. Perineural invasion is identified. Fibroadenoma involved by invasive and in situ carcinoma. Prior procedure site changes. RIGHT ANTERIOR SUPERIOR MARGIN: Benign fibroadipose tissue and skeletal muscle. RIGHT BREAST MEDIAL MARGIN: Benign breast tissue. RIGHT AXILLARY CONTENTS: Six lymph node negative for tumor (0/6). RIGHT SENTINEL LYMPH NODE #1: One lymph node positive for tumor (1/1). Metastatic deposit measures at least 4 mm. Extranodal extension is present.  11/28/23 XR BONE DENSITY AXIAL: Osteopenia. spine T-Score  -1.4  7/23/24 L MG:  No mammographic evidence of malignancy. No significant change. BIRADS 1 [de-identified] : poorly differentiated invasive ductal carcinoma [de-identified] : ER+, DC+, HER2-, Ki-67 32%, Oncotype RS 14 [de-identified] : s/p right TE to implant exchange on 9/3/24.  She has JAVIER drain present, was draining 10mL daily.  She has discomfort from the surgical bra. She has a follow up appointment with Dr. Purdy tomorrow.[place] better off the Verzenio.  She re-started after surgery and felt tired and brain fog again.  Had 1 episode of diarrhea, took imodium with resolution.  Denies chest pain, SOB, pain.  She is eating well.  She is planning to leave for the [facility] in December for 6 months.

## 2024-09-12 NOTE — REVIEW OF SYSTEMS
[Negative] : Allergic/Immunologic [Fatigue] : fatigue [Diarrhea: Grade 1 - Increase of <4 stools per day over baseline; mild increase in ostomy output compared to baseline] : Diarrhea: Grade 1 - Increase of <4 stools per day over baseline; mild increase in ostomy output compared to baseline [FreeTextEntry2] : brain fog

## 2024-09-13 ENCOUNTER — APPOINTMENT (OUTPATIENT)
Dept: PLASTIC SURGERY | Facility: CLINIC | Age: 74
End: 2024-09-13
Payer: MEDICARE

## 2024-09-13 PROBLEM — I48.0 PAROXYSMAL ATRIAL FIBRILLATION: Chronic | Status: ACTIVE | Noted: 2024-09-03

## 2024-09-13 PROBLEM — C50.919 MALIGNANT NEOPLASM OF UNSPECIFIED SITE OF UNSPECIFIED FEMALE BREAST: Chronic | Status: ACTIVE | Noted: 2024-09-03

## 2024-09-13 PROBLEM — I49.3 VENTRICULAR PREMATURE DEPOLARIZATION: Chronic | Status: ACTIVE | Noted: 2024-09-03

## 2024-09-13 PROBLEM — R47.01 APHASIA: Chronic | Status: ACTIVE | Noted: 2024-09-03

## 2024-09-13 PROCEDURE — 99024 POSTOP FOLLOW-UP VISIT: CPT

## 2024-09-13 NOTE — HISTORY OF PRESENT ILLNESS
[FreeTextEntry1] : 73 y/o female presents 10 days s/p revision right breast reconstruction with permanent silicone implant 09/03/2024. Denies any f/c/n/v. Patient is not taking any pain medications. Patient has 1 JAVIER drain. She reports that the implant is much more comfortable than the expander.   PE Gen - NAD Right breast with incision c/d/i. No swelling. It appears that the contracture is starting to recur narrowing the base diameter JAVIER drain with minimal output, removed  A/P OK to shower normal OK to wear normal bra without underwire Can resume all ROM Will return in 4-6 mos to re-evaluate contracture

## 2024-09-13 NOTE — SURGICAL HISTORY
[de-identified] : 10/06/2023: right breast reconstruction utilizing tissue expander [de-identified] : 09/03/2024: revision right breast reconstruction with permanent silicone implant

## 2024-09-13 NOTE — SURGICAL HISTORY
[de-identified] : 10/06/2023: right breast reconstruction utilizing tissue expander [de-identified] : 09/03/2024: revision right breast reconstruction with permanent silicone implant
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
## 2024-10-03 ENCOUNTER — APPOINTMENT (OUTPATIENT)
Dept: PLASTIC SURGERY | Facility: CLINIC | Age: 74
End: 2024-10-03
Payer: MEDICARE

## 2024-10-03 PROCEDURE — 99024 POSTOP FOLLOW-UP VISIT: CPT

## 2024-10-03 NOTE — SURGICAL HISTORY
[de-identified] : 10/06/2023: right breast reconstruction utilizing tissue expander [de-identified] : 09/03/2024: revision right breast reconstruction with permanent silicone implant

## 2024-10-03 NOTE — HISTORY OF PRESENT ILLNESS
[FreeTextEntry1] : 75 y/o female presents 4 weeks s/p revision right breast reconstruction with permanent silicone implant 09/03/2024. Denies any f/c/n/v. Patient is taking tramadol and advil for the pain. She reports that the implant is more comfortable than the expander, yet continues to have pain.   PE Gen - NAD Right breast incision healing well, No swelling, lateral residual radiation fibrosis, moderate contracture, narrowing the base diameter and increasing the projection  A/P OK to shower normal OK to wear normal bra without underwire reviewed stretching and ROM daily self-breast massage  RTC in 3 weeks to re-evaluate contracture

## 2024-10-03 NOTE — SURGICAL HISTORY
[de-identified] : 10/06/2023: right breast reconstruction utilizing tissue expander [de-identified] : 09/03/2024: revision right breast reconstruction with permanent silicone implant

## 2024-10-04 ENCOUNTER — LABORATORY RESULT (OUTPATIENT)
Age: 74
End: 2024-10-04

## 2024-10-04 ENCOUNTER — APPOINTMENT (OUTPATIENT)
Dept: INTERNAL MEDICINE | Facility: CLINIC | Age: 74
End: 2024-10-04

## 2024-10-04 VITALS — SYSTOLIC BLOOD PRESSURE: 102 MMHG | HEART RATE: 60 BPM | DIASTOLIC BLOOD PRESSURE: 58 MMHG

## 2024-10-04 VITALS
SYSTOLIC BLOOD PRESSURE: 112 MMHG | HEART RATE: 58 BPM | TEMPERATURE: 97.6 F | BODY MASS INDEX: 21.16 KG/M2 | OXYGEN SATURATION: 97 % | HEIGHT: 62 IN | DIASTOLIC BLOOD PRESSURE: 62 MMHG | WEIGHT: 115 LBS

## 2024-10-04 VITALS — HEART RATE: 64 BPM

## 2024-10-04 VITALS — DIASTOLIC BLOOD PRESSURE: 56 MMHG | SYSTOLIC BLOOD PRESSURE: 106 MMHG

## 2024-10-04 VITALS — HEART RATE: 60 BPM | DIASTOLIC BLOOD PRESSURE: 60 MMHG | SYSTOLIC BLOOD PRESSURE: 92 MMHG

## 2024-10-04 DIAGNOSIS — R42 DIZZINESS AND GIDDINESS: ICD-10-CM

## 2024-10-04 DIAGNOSIS — R53.83 OTHER FATIGUE: ICD-10-CM

## 2024-10-04 PROCEDURE — 99214 OFFICE O/P EST MOD 30 MIN: CPT

## 2024-10-04 PROCEDURE — G2211 COMPLEX E/M VISIT ADD ON: CPT

## 2024-10-04 PROCEDURE — 36415 COLL VENOUS BLD VENIPUNCTURE: CPT

## 2024-10-08 DIAGNOSIS — D61.818 OTHER PANCYTOPENIA: ICD-10-CM

## 2024-10-09 LAB
ALBUMIN SERPL ELPH-MCNC: 4.3 G/DL
ALP BLD-CCNC: 78 U/L
ALT SERPL-CCNC: 27 U/L
ANION GAP SERPL CALC-SCNC: 13 MMOL/L
AST SERPL-CCNC: 39 U/L
BACTERIA UR CULT: NORMAL
BASOPHILS # BLD AUTO: 0.09 K/UL
BASOPHILS NFR BLD AUTO: 2.6 %
BILIRUB SERPL-MCNC: 0.3 MG/DL
BUN SERPL-MCNC: 17 MG/DL
CALCIUM SERPL-MCNC: 10.8 MG/DL
CHLORIDE SERPL-SCNC: 105 MMOL/L
CO2 SERPL-SCNC: 24 MMOL/L
CREAT SERPL-MCNC: 1.19 MG/DL
EGFR: 48 ML/MIN/1.73M2
EOSINOPHIL # BLD AUTO: 0.41 K/UL
EOSINOPHIL NFR BLD AUTO: 11.3 %
GLUCOSE SERPL-MCNC: 104 MG/DL
HCT VFR BLD CALC: 33.6 %
HGB BLD-MCNC: 10.7 G/DL
LYMPHOCYTES # BLD AUTO: 1.11 K/UL
LYMPHOCYTES NFR BLD AUTO: 30.4 %
MAN DIFF?: NORMAL
MCHC RBC-ENTMCNC: 31.8 GM/DL
MCHC RBC-ENTMCNC: 32.9 PG
MCV RBC AUTO: 103.4 FL
MONOCYTES # BLD AUTO: 0.09 K/UL
MONOCYTES NFR BLD AUTO: 2.6 %
NEUTROPHILS # BLD AUTO: 1.93 K/UL
NEUTROPHILS NFR BLD AUTO: 53.1 %
PLATELET # BLD AUTO: 129 K/UL
POTASSIUM SERPL-SCNC: 5.1 MMOL/L
PROT SERPL-MCNC: 7.2 G/DL
RBC # BLD: 3.25 M/UL
RBC # FLD: 12.1 %
SODIUM SERPL-SCNC: 142 MMOL/L
TSH SERPL-ACNC: 0.67 UIU/ML
WBC # FLD AUTO: 3.64 K/UL

## 2024-10-09 NOTE — HISTORY OF PRESENT ILLNESS
[FreeTextEntry8] : 74 year old woman presenting for acute visit for dizziness.  Accompanied by her   Has been having intermittent "spells" of dizzy feeling. Yesterday it occurred while she was doing a virtual speech therapy appointment. Yesterday it also occurred when she bent down. Taking tramadol a few times a week after breast reconstruction. Her  notes some of the episodes are the morning after she's taking tramadol. She'll feel it for minutes, then she'll lie down for a nap and when she wakes up it'll be gone.  She has been not moving very much after the surgery, but after this week's follow-up with Dr. Purdy she was recommended to begin exercising and her  is trying to encourage her to walk more regularly.  Orthostatic vital signs taken are not consistent with orthostatic hypotension, but all readings are quite low.  She and her  pulled up recent blood pressure readings on her phone, and they are fairly varied including some higher numbers, but those higher readings might be readings that were taken right after she wakes up.  She is currently only taking half a pill of amlodipine 5 mg, so 2.5 mg daily.  Recommended holding the amlodipine for the next few days and with continuing to monitor blood pressure, but avoiding taking blood pressure as soon as she wakes up. [Spouse] : spouse

## 2024-10-23 ENCOUNTER — APPOINTMENT (OUTPATIENT)
Dept: MRI IMAGING | Facility: HOSPITAL | Age: 74
End: 2024-10-23

## 2024-10-23 ENCOUNTER — APPOINTMENT (OUTPATIENT)
Dept: BREAST CENTER | Facility: CLINIC | Age: 74
End: 2024-10-23

## 2024-10-24 ENCOUNTER — APPOINTMENT (OUTPATIENT)
Dept: PLASTIC SURGERY | Facility: CLINIC | Age: 74
End: 2024-10-24
Payer: MEDICARE

## 2024-10-24 DIAGNOSIS — Z98.890 OTHER SPECIFIED POSTPROCEDURAL STATES: ICD-10-CM

## 2024-10-24 PROCEDURE — 99024 POSTOP FOLLOW-UP VISIT: CPT

## 2024-10-28 ENCOUNTER — APPOINTMENT (OUTPATIENT)
Dept: INTERNAL MEDICINE | Facility: CLINIC | Age: 74
End: 2024-10-28

## 2024-10-28 VITALS
SYSTOLIC BLOOD PRESSURE: 129 MMHG | DIASTOLIC BLOOD PRESSURE: 72 MMHG | TEMPERATURE: 98 F | OXYGEN SATURATION: 99 % | HEIGHT: 62 IN | HEART RATE: 60 BPM | WEIGHT: 115 LBS | BODY MASS INDEX: 21.16 KG/M2

## 2024-10-28 DIAGNOSIS — D32.9 BENIGN NEOPLASM OF MENINGES, UNSPECIFIED: ICD-10-CM

## 2024-10-28 DIAGNOSIS — I10 ESSENTIAL (PRIMARY) HYPERTENSION: ICD-10-CM

## 2024-10-28 PROCEDURE — G2211 COMPLEX E/M VISIT ADD ON: CPT

## 2024-10-28 PROCEDURE — 99213 OFFICE O/P EST LOW 20 MIN: CPT

## 2024-11-13 ENCOUNTER — APPOINTMENT (OUTPATIENT)
Dept: BREAST CENTER | Facility: CLINIC | Age: 74
End: 2024-11-13
Payer: MEDICARE

## 2024-11-13 VITALS — WEIGHT: 116 LBS | HEIGHT: 62 IN | BODY MASS INDEX: 21.35 KG/M2

## 2024-11-13 DIAGNOSIS — Z12.39 ENCOUNTER FOR OTHER SCREENING FOR MALIGNANT NEOPLASM OF BREAST: ICD-10-CM

## 2024-11-13 DIAGNOSIS — Z80.3 FAMILY HISTORY OF MALIGNANT NEOPLASM OF BREAST: ICD-10-CM

## 2024-11-13 PROCEDURE — 93702 BIS XTRACELL FLUID ANALYSIS: CPT | Mod: NC

## 2024-11-13 PROCEDURE — 99213 OFFICE O/P EST LOW 20 MIN: CPT

## 2024-11-19 ENCOUNTER — NON-APPOINTMENT (OUTPATIENT)
Age: 74
End: 2024-11-19

## 2025-02-03 ENCOUNTER — NON-APPOINTMENT (OUTPATIENT)
Age: 75
End: 2025-02-03

## 2025-06-09 ENCOUNTER — TRANSCRIPTION ENCOUNTER (OUTPATIENT)
Age: 75
End: 2025-06-09

## 2025-06-12 ENCOUNTER — APPOINTMENT (OUTPATIENT)
Dept: HEMATOLOGY ONCOLOGY | Facility: CLINIC | Age: 75
End: 2025-06-12
Payer: MEDICARE

## 2025-06-12 VITALS
HEIGHT: 62 IN | SYSTOLIC BLOOD PRESSURE: 129 MMHG | HEART RATE: 71 BPM | WEIGHT: 113 LBS | OXYGEN SATURATION: 99 % | DIASTOLIC BLOOD PRESSURE: 67 MMHG | BODY MASS INDEX: 20.8 KG/M2 | RESPIRATION RATE: 18 BRPM | TEMPERATURE: 98.1 F

## 2025-06-12 PROCEDURE — 99214 OFFICE O/P EST MOD 30 MIN: CPT

## 2025-06-12 PROCEDURE — G2211 COMPLEX E/M VISIT ADD ON: CPT

## 2025-06-16 ENCOUNTER — APPOINTMENT (OUTPATIENT)
Dept: INTERNAL MEDICINE | Facility: CLINIC | Age: 75
End: 2025-06-16
Payer: MEDICARE

## 2025-06-16 VITALS
WEIGHT: 113 LBS | TEMPERATURE: 98 F | DIASTOLIC BLOOD PRESSURE: 76 MMHG | OXYGEN SATURATION: 99 % | SYSTOLIC BLOOD PRESSURE: 133 MMHG | HEIGHT: 62 IN | BODY MASS INDEX: 20.8 KG/M2 | HEART RATE: 70 BPM

## 2025-06-16 PROBLEM — R10.9 ABDOMINAL CRAMPING: Status: RESOLVED | Noted: 2022-07-15 | Resolved: 2025-06-16

## 2025-06-16 PROBLEM — M65.311 TRIGGER THUMB OF RIGHT HAND: Status: RESOLVED | Noted: 2021-06-08 | Resolved: 2025-06-16

## 2025-06-16 PROBLEM — Z95.0 PACEMAKER: Status: ACTIVE | Noted: 2025-06-16

## 2025-06-16 PROBLEM — R10.32 ACUTE LEFT LOWER QUADRANT PAIN: Status: RESOLVED | Noted: 2022-06-01 | Resolved: 2025-06-16

## 2025-06-16 PROBLEM — Z42.1 ENCOUNTER FOR BREAST RECONSTRUCTION FOLLOWING MASTECTOMY: Status: RESOLVED | Noted: 2023-09-27 | Resolved: 2025-06-16

## 2025-06-16 PROBLEM — M71.21 SYNOVIAL CYST OF RIGHT KNEE: Status: RESOLVED | Noted: 2022-07-15 | Resolved: 2025-06-16

## 2025-06-16 PROBLEM — J35.8 CRYPTIC TONSIL: Status: RESOLVED | Noted: 2019-10-01 | Resolved: 2025-06-16

## 2025-06-16 PROBLEM — G89.18 POST-OPERATIVE PAIN: Status: RESOLVED | Noted: 2024-01-18 | Resolved: 2025-06-16

## 2025-06-16 PROCEDURE — G0439: CPT

## 2025-06-16 RX ORDER — FLECAINIDE ACETATE 50 MG/1
50 TABLET ORAL
Qty: 180 | Refills: 1 | Status: ACTIVE | COMMUNITY
Start: 2025-06-16 | End: 1900-01-01

## 2025-07-17 ENCOUNTER — RX RENEWAL (OUTPATIENT)
Age: 75
End: 2025-07-17

## 2025-07-25 ENCOUNTER — NON-APPOINTMENT (OUTPATIENT)
Age: 75
End: 2025-07-25

## 2025-07-25 PROBLEM — R92.8 ABNORMAL FINDING ON BREAST IMAGING: Status: ACTIVE | Noted: 2025-07-25

## 2025-07-30 ENCOUNTER — APPOINTMENT (OUTPATIENT)
Dept: BREAST CENTER | Facility: CLINIC | Age: 75
End: 2025-07-30
Payer: MEDICARE

## 2025-07-30 VITALS — WEIGHT: 114 LBS | HEIGHT: 62 IN | BODY MASS INDEX: 20.98 KG/M2

## 2025-07-30 DIAGNOSIS — C50.911 MALIGNANT NEOPLASM OF UNSPECIFIED SITE OF RIGHT FEMALE BREAST: ICD-10-CM

## 2025-07-30 DIAGNOSIS — Z08 ENCOUNTER FOR FOLLOW-UP EXAMINATION AFTER COMPLETED TREATMENT FOR MALIGNANT NEOPLASM: ICD-10-CM

## 2025-07-30 DIAGNOSIS — Z85.3 ENCOUNTER FOR FOLLOW-UP EXAMINATION AFTER COMPLETED TREATMENT FOR MALIGNANT NEOPLASM: ICD-10-CM

## 2025-07-30 DIAGNOSIS — R92.8 OTHER ABNORMAL AND INCONCLUSIVE FINDINGS ON DIAGNOSTIC IMAGING OF BREAST: ICD-10-CM

## 2025-07-30 PROCEDURE — 93702 BIS XTRACELL FLUID ANALYSIS: CPT | Mod: NC

## 2025-07-30 PROCEDURE — 99214 OFFICE O/P EST MOD 30 MIN: CPT

## 2025-08-13 ENCOUNTER — APPOINTMENT (OUTPATIENT)
Dept: INTERNAL MEDICINE | Facility: CLINIC | Age: 75
End: 2025-08-13
Payer: MEDICARE

## 2025-08-13 VITALS
DIASTOLIC BLOOD PRESSURE: 75 MMHG | HEART RATE: 73 BPM | OXYGEN SATURATION: 98 % | SYSTOLIC BLOOD PRESSURE: 138 MMHG | TEMPERATURE: 96.5 F | RESPIRATION RATE: 16 BRPM | HEIGHT: 62 IN | WEIGHT: 115 LBS | BODY MASS INDEX: 21.16 KG/M2

## 2025-08-13 DIAGNOSIS — L24.9 IRRITANT CONTACT DERMATITIS, UNSPECIFIED CAUSE: ICD-10-CM

## 2025-08-13 PROCEDURE — 99214 OFFICE O/P EST MOD 30 MIN: CPT

## 2025-08-13 RX ORDER — MOMETASONE FUROATE 1 MG/G
0.1 OINTMENT TOPICAL TWICE DAILY
Qty: 1 | Refills: 1 | Status: ACTIVE | COMMUNITY
Start: 2025-08-13 | End: 1900-01-01

## 2025-08-13 RX ORDER — FLUOCINOLONE ACETONIDE 0.11 MG/ML
0.01 OIL TOPICAL
Qty: 1 | Refills: 1 | Status: ACTIVE | COMMUNITY
Start: 2025-08-13 | End: 1900-01-01

## 2025-08-21 DIAGNOSIS — R10.814 LEFT LOWER QUADRANT ABDOMINAL TENDERNESS: ICD-10-CM

## 2025-08-21 DIAGNOSIS — Z87.898 PERSONAL HISTORY OF OTHER SPECIFIED CONDITIONS: ICD-10-CM

## 2025-08-21 DIAGNOSIS — R10.9 UNSPECIFIED ABDOMINAL PAIN: ICD-10-CM

## 2025-08-21 DIAGNOSIS — H61.23 IMPACTED CERUMEN, BILATERAL: ICD-10-CM

## 2025-08-21 DIAGNOSIS — Z01.818 ENCOUNTER FOR OTHER PREPROCEDURAL EXAMINATION: ICD-10-CM

## 2025-08-21 DIAGNOSIS — Z01.810 ENCOUNTER FOR PREPROCEDURAL CARDIOVASCULAR EXAMINATION: ICD-10-CM

## 2025-08-21 DIAGNOSIS — Z23 ENCOUNTER FOR IMMUNIZATION: ICD-10-CM

## 2025-08-21 DIAGNOSIS — M25.562 PAIN IN LEFT KNEE: ICD-10-CM

## 2025-08-21 DIAGNOSIS — Z01.812 ENCOUNTER FOR PREPROCEDURAL LABORATORY EXAMINATION: ICD-10-CM

## 2025-09-18 ENCOUNTER — APPOINTMENT (OUTPATIENT)
Dept: DERMATOLOGY | Facility: CLINIC | Age: 75
End: 2025-09-18
Payer: MEDICARE

## 2025-09-18 DIAGNOSIS — L20.9 ATOPIC DERMATITIS, UNSPECIFIED: ICD-10-CM

## 2025-09-18 DIAGNOSIS — L85.3 XEROSIS CUTIS: ICD-10-CM

## 2025-09-18 DIAGNOSIS — L29.9 PRURITUS, UNSPECIFIED: ICD-10-CM

## 2025-09-18 PROCEDURE — 99204 OFFICE O/P NEW MOD 45 MIN: CPT

## 2025-09-18 RX ORDER — TRIAMCINOLONE ACETONIDE 1 MG/G
0.1 CREAM TOPICAL
Qty: 454 | Refills: 0 | Status: ACTIVE | COMMUNITY
Start: 2025-09-18 | End: 1900-01-01

## (undated) DEVICE — DRSG COMBINE 5X9"

## (undated) DEVICE — DRAPE TOP SHEET 53" X 101"

## (undated) DEVICE — ONETRAC LIGHTED RETRACTOR 90 X 22MM DISP

## (undated) DEVICE — STAPLER SKIN INSORB

## (undated) DEVICE — SUT SILK 3-0 18" TIES

## (undated) DEVICE — Device

## (undated) DEVICE — DRAPE MAYO STAND 23"

## (undated) DEVICE — SUT MONOCRYL 4-0 27" PS-2 UNDYED

## (undated) DEVICE — VENODYNE/SCD SLEEVE CALF MEDIUM

## (undated) DEVICE — SUT ENSEAL CVD 14CM

## (undated) DEVICE — PREP BETADINE SPONGE STICKS

## (undated) DEVICE — PACK BREAST RECONSTRUCTION

## (undated) DEVICE — SUT ETHILON 3-0 18" FS-1

## (undated) DEVICE — MARKING PEN W RULER

## (undated) DEVICE — SUT MONOCRYL 3-0 18" PS-1

## (undated) DEVICE — SYR LUER LOK 5CC

## (undated) DEVICE — STAPLER SKIN VISI-STAT 35 WIDE

## (undated) DEVICE — WARMING BLANKET LOWER ADULT

## (undated) DEVICE — KELLER FUNNEL

## (undated) DEVICE — BLADE SCALPEL SAFETYLOCK #10

## (undated) DEVICE — SUT MONOCRYL 3-0 18" PS-2 UNDYED

## (undated) DEVICE — DRSG GAUZE MOISTURIZER 0.5 OZ 4X8

## (undated) DEVICE — SYR LUER LOK 10CC

## (undated) DEVICE — DRSG SURGICAL BRA MED 36-38

## (undated) DEVICE — DRAPE MAGNETIC INSTRUMENT MEDIUM

## (undated) DEVICE — WARMING BLANKET FULL UNDERBODY

## (undated) DEVICE — FUNNEL STRL 6 IND PK

## (undated) DEVICE — SLV COMPRESSION KNEE MED

## (undated) DEVICE — DRAPE CAMERA VIDEO 7"X96"

## (undated) DEVICE — PREP CHLORAPREP HI-LITE ORANGE 26ML

## (undated) DEVICE — DRSG ACE BANDAGE 6"

## (undated) DEVICE — DRAIN RESERVOIR FOR JACKSON PRATT 100CC CARDINAL

## (undated) DEVICE — DRSG DERMABOND 0.7ML

## (undated) DEVICE — GLV 8 PROTEXIS (WHITE)

## (undated) DEVICE — SUT SILK 2-0 30" PSL

## (undated) DEVICE — DRSG KERLIX ROLL 4.5"

## (undated) DEVICE — ONETRAC LIGHTED RETRACTOR 135 X 30MM DISP

## (undated) DEVICE — SUT MONOCRYL 5-0 18" P-3 UNDYED

## (undated) DEVICE — SUT SILK 2-0 18" FS

## (undated) DEVICE — DRAPE NAVIGATOR COVER

## (undated) DEVICE — ELCTR PENCIL SMOKE EVACUATOR COATED PUSH BUTTON 70MM

## (undated) DEVICE — GLV 7.5 PROTEXIS (WHITE)

## (undated) DEVICE — SPONGE PEANUT AUTO COUNT

## (undated) DEVICE — PREP BETADINE KIT

## (undated) DEVICE — ELCTR STRYKER NEPTUNE BLADE COATED, INSULATED 70MM

## (undated) DEVICE — ELCTR BOVIE TIP BLADE INSULATED 2.75" EDGE

## (undated) DEVICE — BRA PINK MED 33-36"

## (undated) DEVICE — GLV 6.5 PROTEXIS W HYDROGEL

## (undated) DEVICE — SUT VICRYL 2-0 27" SH UNDYED

## (undated) DEVICE — SUCTION YANKAUER BULBOUS TIP W VENT

## (undated) DEVICE — PACK HAND

## (undated) DEVICE — DRAPE TOWEL BLUE 17" X 24"

## (undated) DEVICE — SUT MONOCRYL 3-0 27" PS-2 UNDYED

## (undated) DEVICE — SUT VICRYL 3-0 18" SH UNDYED (POP-OFF)

## (undated) DEVICE — SUT MONOCRYL 4-0 18" PS-2

## (undated) DEVICE — ELCTR STRYKER BLADE COATED INSULATED 165MM

## (undated) DEVICE — POSITIONER FOAM EGG CRATE ULNAR 2PCS (PINK)

## (undated) DEVICE — BLADE SURGICAL #15 CARBON

## (undated) DEVICE — ELCTR BOVIE BLADE 3/4" EXTENDED LENGTH 6"

## (undated) DEVICE — DRSG SURGICAL BRA LG 38-40

## (undated) DEVICE — DRSG GAUZE FLUFF 1PLY 18X30"

## (undated) DEVICE — PACK UPPER BODY

## (undated) DEVICE — TOURNIQUET CUFF 24" DUAL PORT SINGLE BLADDER W PLC (BLACK)

## (undated) DEVICE — DRSG TELFA 3 X 8

## (undated) DEVICE — STAPLER SKIN PROXIMATE

## (undated) DEVICE — INVUITY ILLUMINATOR EIGR WAVEGUIDE, WIDE/FLAT DISP

## (undated) DEVICE — TOURNIQUET CUFF 18" DUAL PORT SINGLE BLADDER W PLC  (BLACK)

## (undated) DEVICE — DRSG 3M TEGADERM CHG CHLORHEXIDINE GLUCONATE GEL PAD

## (undated) DEVICE — DRSG SURGICAL BRA SM 34-36

## (undated) DEVICE — ELCTR STRYKER NEPTUNE SMOKE EVACUATION PENCIL (GREEN)

## (undated) DEVICE — SYR LUER LOK 50CC